# Patient Record
Sex: MALE | Race: WHITE | HISPANIC OR LATINO | Employment: FULL TIME | URBAN - METROPOLITAN AREA
[De-identification: names, ages, dates, MRNs, and addresses within clinical notes are randomized per-mention and may not be internally consistent; named-entity substitution may affect disease eponyms.]

---

## 2017-03-14 ENCOUNTER — OFFICE VISIT (OUTPATIENT)
Dept: MEDICAL GROUP | Facility: LAB | Age: 37
End: 2017-03-14
Payer: COMMERCIAL

## 2017-03-14 VITALS
SYSTOLIC BLOOD PRESSURE: 122 MMHG | HEIGHT: 69 IN | OXYGEN SATURATION: 94 % | WEIGHT: 225 LBS | DIASTOLIC BLOOD PRESSURE: 84 MMHG | BODY MASS INDEX: 33.33 KG/M2 | TEMPERATURE: 97.7 F | HEART RATE: 88 BPM | RESPIRATION RATE: 12 BRPM

## 2017-03-14 DIAGNOSIS — F51.01 PRIMARY INSOMNIA: ICD-10-CM

## 2017-03-14 DIAGNOSIS — R06.2 WHEEZING: ICD-10-CM

## 2017-03-14 PROCEDURE — 99214 OFFICE O/P EST MOD 30 MIN: CPT | Performed by: FAMILY MEDICINE

## 2017-03-14 RX ORDER — ZOLPIDEM TARTRATE 10 MG/1
10 TABLET ORAL NIGHTLY PRN
Qty: 30 TAB | Refills: 2 | Status: SHIPPED | OUTPATIENT
Start: 2017-03-14 | End: 2017-09-29 | Stop reason: SDUPTHER

## 2017-03-14 NOTE — PROGRESS NOTES
Subjective:   Andres Waldron is a 36 y.o. male here today for   Chief Complaint   Patient presents with   • Insomnia       1. Primary insomnia  This is a new problem. Patient reports 1 year of difficulty falling and staying asleep. He states that he goes to bed at about 11:30PM and wakes up at 8 AM. When he goes to bed, he has a difficult time falling asleep. He states that his mind starts racing and he cannot turn it off. He goes and checks on his kids often. When he does finally fall asleep he wakes up multiple times throughout the night. He is using electronics up until bedtime his phone or TB to wind down. He does not use any caffeine later then noon. He usually exercises later in the day after he gets off of work.     Results for ANDRES WALDRON (MRN [id number]) as of 3/14/2017 10:49   Ref. Range 12/21/2016 10:12   Sodium Latest Ref Range: 135-145 mmol/L 138   Potassium Latest Ref Range: 3.6-5.5 mmol/L 3.8   Chloride Latest Ref Range:  mmol/L 104   Co2 Latest Ref Range: 20-33 mmol/L 25   Anion Gap Latest Ref Range: 0.0-11.9  9.0   Glucose Latest Ref Range: 65-99 mg/dL 81   Bun Latest Ref Range: 8-22 mg/dL 19   Creatinine Latest Ref Range: 0.50-1.40 mg/dL 1.02   GFR If  Latest Ref Range: >60 mL/min/1.73 m 2 >60   GFR If Non  Latest Ref Range: >60 mL/min/1.73 m 2 >60   Calcium Latest Ref Range: 8.5-10.5 mg/dL 9.9   AST(SGOT) Latest Ref Range: 12-45 U/L 26   ALT(SGPT) Latest Ref Range: 2-50 U/L 30   Alkaline Phosphatase Latest Ref Range: 30-99 U/L 48   Total Bilirubin Latest Ref Range: 0.1-1.5 mg/dL 0.7   Albumin Latest Ref Range: 3.2-4.9 g/dL 4.4   Total Protein Latest Ref Range: 6.0-8.2 g/dL 7.8   Globulin Latest Ref Range: 1.9-3.5 g/dL 3.4   A-G Ratio Latest Units: g/dL 1.3   Cholesterol,Tot Latest Ref Range: 100-199 mg/dL 156   Triglycerides Latest Ref Range: 0-149 mg/dL 129   HDL Latest Ref Range: >=40 mg/dL 52   LDL Latest Ref Range: <100 mg/dL 78   25-Hydroxy    "Vitamin D 25 Latest Ref Range:  ng/mL 29 (L)   TSH Latest Ref Range: 0.300-3.700 uIU/mL 1.060     2. Wheezing  This is a new problem. Patient has noticed over the last several months, he starts wheezing at nighttime when he is going to bed. He does not wheeze when he is at work. He denies any chronic cough. He has never had seasonal allergies or asthma. They do have a pet at home but this isn't new and the wheezing started before. He denies any rhinorrhea, postnasal drip, itchy eyes, sputum production, chest pain, snoring, sleep apnea.      Current medicines (including changes today)  Current Outpatient Prescriptions   Medication Sig Dispense Refill   • zolpidem (AMBIEN) 10 MG Tab Take 1 Tab by mouth at bedtime as needed for Sleep. 30 Tab 2   • Adalimumab (HUMIRA) 40 MG/0.8ML Prefilled Syringe Kit Inject  as instructed.       No current facility-administered medications for this visit.     He  has a past medical history of Crohn's disease (CMS-HCC) and Allergy.    ROS   No fevers  No bowel changes  No LE edema       Objective:     Blood pressure 122/84, pulse 88, temperature 36.5 °C (97.7 °F), resp. rate 12, height 1.753 m (5' 9.02\"), weight 102.059 kg (225 lb), SpO2 94 %. Body mass index is 33.21 kg/(m^2).   Physical Exam:  Constitutional: Alert, no distress.  Skin: Warm, dry, good turgor, no rashes in visible areas.  Eye: Equal, round and reactive, conjunctiva clear, lids normal.  ENMT: Lips without lesions, good dentition, oropharynx clear.  Neck: Trachea midline, no masses, no thyromegaly. No cervical or supraclavicular lymphadenopathy  Respiratory: Unlabored respiratory effort, lungs clear to auscultation, no wheezes, no ronchi.  Cardiovascular: Normal S1, S2, RRR, no murmur, no edema.  Abdomen: Soft, non-tender, no masses, no hepatosplenomegaly.  Psych: Alert and oriented x3, normal affect and mood.      Assessment and Plan:   The following treatment plan was discussed    1. Primary insomnia  New, labs " reviewed as above with a normal recent TSH and vitamin D level of 29, no anemia, no electrolyte deficiencies  Start melatonin nightly  If no improvement, start ambien, Rx given. Counseled not to take this nightly  Lifestyle modifications discussed  - zolpidem (AMBIEN) 10 MG Tab; Take 1 Tab by mouth at bedtime as needed for Sleep.  Dispense: 30 Tab; Refill: 2    2. Wheezing  New, likely allergy to something at home. Discussed starting daily Zyrtec and monitoring symptoms. Patient states that there is no mold at home but he will check for this as well  Referral to allergy for skin testing possibly  - REFERRAL TO ALLERGY      Followup: Return in about 6 months (around 9/14/2017), or if symptoms worsen or fail to improve, for Annual, Long.       This note was created using voice recognition software. I have made every reasonable attempt to correct errors, however, I do anticipate some grammatical errors.

## 2017-03-14 NOTE — MR AVS SNAPSHOT
"        Andres Thacker   3/14/2017 8:20 AM   Office Visit   MRN: [id number]    Department:  [facility]   Dept Phone:  [phone]    Description:  Male : 1980   Provider:  Ne Guthrie M.D.           Reason for Visit     Insomnia           Allergies as of 3/14/2017     Allergen Noted Reactions    Pcn [Penicillins] 2015   Hives    Remicade [Infliximab] 2015   Anaphylaxis      You were diagnosed with     Primary insomnia   [377375]       Wheezing   [786.07.ICD-9-CM]         Vital Signs     Blood Pressure Pulse Temperature Respirations Height Weight    122/84 mmHg 88 36.5 °C (97.7 °F) 12 1.753 m (5' 9.02\") 102.059 kg (225 lb)    Body Mass Index Oxygen Saturation Smoking Status             33.21 kg/m2 94% Never Smoker          Basic Information     Date Of Birth Sex Race Ethnicity Preferred Language    1980 Male White Non- English      Problem List              ICD-10-CM Priority Class Noted - Resolved    Obesity (BMI 30-39.9) E66.9   2016 - Present    Low vitamin D level E55.9   2016 - Present    Health care maintenance Z00.00   2016 - Present    Crohn's disease of both small and large intestine without complication (CMS-[facility]) K50.80   2016 - Present    Primary insomnia F51.01   3/14/2017 - Present    Wheezing R06.2   3/14/2017 - Present      Health Maintenance        Date Due Completion Dates    IMM DTaP/Tdap/Td Vaccine (1 - Tdap) 10/16/1998 ---            Current Immunizations     13-VALENT PCV PREVNAR 2016    Influenza Vaccine Quad Inj (Pf) 2016      Below and/or attached are the medications your provider expects you to take. Review all of your home medications and newly ordered medications with your provider and/or pharmacist. Follow medication instructions as directed by your provider and/or pharmacist. Please keep your medication list with you and share with your provider. Update the information when medications are " discontinued, doses are changed, or new medications (including over-the-counter products) are added; and carry medication information at all times in the event of emergency situations     Allergies:  PCN - Hives     REMICADE - Anaphylaxis               Medications  Valid as of: March 14, 2017 -  8:57 AM    Generic Name Brand Name Tablet Size Instructions for use    Adalimumab (Prefilled Syringe Kit) Adalimumab 40 MG/0.8ML Inject  as instructed.        Zolpidem Tartrate (Tab) AMBIEN 10 MG Take 1 Tab by mouth at bedtime as needed for Sleep.        .                 Medicines prescribed today were sent to:     [facility]Y #[id number] - [place], [place] - [address]    [address] [place] [address]    Phone: [phone] Fax: [phone]    Open 24 Hours?: No      Medication refill instructions:       If your prescription bottle indicates you have medication refills left, it is not necessary to call your provider’s office. Please contact your pharmacy and they will refill your medication.    If your prescription bottle indicates you do not have any refills left, you may request refills at any time through one of the following ways: The online "[employer] system (except Urgent Care), by calling your provider’s office, or by asking your pharmacy to contact your provider’s office with a refill request. Medication refills are processed only during regular business hours and may not be available until the next business day. Your provider may request additional information or to have a follow-up visit with you prior to refilling your medication.   *Please Note: Medication refills are assigned a new Rx number when refilled electronically. Your pharmacy may indicate that no refills were authorized even though a new prescription for the same medication is available at the pharmacy. Please request the medicine by name with the pharmacy before contacting your provider for a refill.        Referral     A referral request has been  sent to our patient care coordination department. Please allow 3-5 business days for us to process this request and contact you either by phone or mail. If you do not hear from us by the 5th business day, please call us at [phone].           [employer] Access Code: Activation code not generated  Current [employer] Status: Active

## 2017-04-19 ENCOUNTER — HOSPITAL ENCOUNTER (OUTPATIENT)
Dept: LAB | Facility: MEDICAL CENTER | Age: 37
End: 2017-04-19
Attending: INTERNAL MEDICINE
Payer: COMMERCIAL

## 2017-04-19 LAB
BASOPHILS # BLD AUTO: 0.9 % (ref 0–1.8)
BASOPHILS # BLD: 0.06 K/UL (ref 0–0.12)
EOSINOPHIL # BLD AUTO: 0.45 K/UL (ref 0–0.51)
EOSINOPHIL NFR BLD: 7 % (ref 0–6.9)
ERYTHROCYTE [DISTWIDTH] IN BLOOD BY AUTOMATED COUNT: 41.7 FL (ref 35.9–50)
HCT VFR BLD AUTO: 46.5 % (ref 42–52)
HGB BLD-MCNC: 15.9 G/DL (ref 14–18)
IMM GRANULOCYTES # BLD AUTO: 0.01 K/UL (ref 0–0.11)
IMM GRANULOCYTES NFR BLD AUTO: 0.2 % (ref 0–0.9)
LYMPHOCYTES # BLD AUTO: 2.04 K/UL (ref 1–4.8)
LYMPHOCYTES NFR BLD: 31.9 % (ref 22–41)
MCH RBC QN AUTO: 31.4 PG (ref 27–33)
MCHC RBC AUTO-ENTMCNC: 34.2 G/DL (ref 33.7–35.3)
MCV RBC AUTO: 91.9 FL (ref 81.4–97.8)
MONOCYTES # BLD AUTO: 0.67 K/UL (ref 0–0.85)
MONOCYTES NFR BLD AUTO: 10.5 % (ref 0–13.4)
NEUTROPHILS # BLD AUTO: 3.17 K/UL (ref 1.82–7.42)
NEUTROPHILS NFR BLD: 49.5 % (ref 44–72)
NRBC # BLD AUTO: 0 K/UL
NRBC BLD AUTO-RTO: 0 /100 WBC
PLATELET # BLD AUTO: 279 K/UL (ref 164–446)
PMV BLD AUTO: 9.4 FL (ref 9–12.9)
RBC # BLD AUTO: 5.06 M/UL (ref 4.7–6.1)
WBC # BLD AUTO: 6.4 K/UL (ref 4.8–10.8)

## 2017-04-19 PROCEDURE — 80053 COMPREHEN METABOLIC PANEL: CPT

## 2017-04-19 PROCEDURE — 36415 COLL VENOUS BLD VENIPUNCTURE: CPT

## 2017-04-19 PROCEDURE — 85025 COMPLETE CBC W/AUTO DIFF WBC: CPT

## 2017-04-20 LAB
ALBUMIN SERPL BCP-MCNC: 4.2 G/DL (ref 3.2–4.9)
ALBUMIN/GLOB SERPL: 1.2 G/DL
ALP SERPL-CCNC: 60 U/L (ref 30–99)
ALT SERPL-CCNC: 19 U/L (ref 2–50)
ANION GAP SERPL CALC-SCNC: 9 MMOL/L (ref 0–11.9)
AST SERPL-CCNC: 16 U/L (ref 12–45)
BILIRUB SERPL-MCNC: 0.6 MG/DL (ref 0.1–1.5)
BUN SERPL-MCNC: 15 MG/DL (ref 8–22)
CALCIUM SERPL-MCNC: 9.7 MG/DL (ref 8.5–10.5)
CHLORIDE SERPL-SCNC: 104 MMOL/L (ref 96–112)
CO2 SERPL-SCNC: 27 MMOL/L (ref 20–33)
CREAT SERPL-MCNC: 0.95 MG/DL (ref 0.5–1.4)
GFR SERPL CREATININE-BSD FRML MDRD: >60 ML/MIN/1.73 M 2
GLOBULIN SER CALC-MCNC: 3.6 G/DL (ref 1.9–3.5)
GLUCOSE SERPL-MCNC: 96 MG/DL (ref 65–99)
POTASSIUM SERPL-SCNC: 4.1 MMOL/L (ref 3.6–5.5)
PROT SERPL-MCNC: 7.8 G/DL (ref 6–8.2)
SODIUM SERPL-SCNC: 140 MMOL/L (ref 135–145)

## 2017-05-03 ENCOUNTER — OFFICE VISIT (OUTPATIENT)
Dept: URGENT CARE | Facility: CLINIC | Age: 37
End: 2017-05-03
Payer: COMMERCIAL

## 2017-05-03 VITALS
HEART RATE: 84 BPM | TEMPERATURE: 98 F | OXYGEN SATURATION: 97 % | SYSTOLIC BLOOD PRESSURE: 110 MMHG | WEIGHT: 227 LBS | RESPIRATION RATE: 20 BRPM | BODY MASS INDEX: 33.51 KG/M2 | DIASTOLIC BLOOD PRESSURE: 72 MMHG

## 2017-05-03 DIAGNOSIS — R07.89 DISCOMFORT OF CHEST WALL: ICD-10-CM

## 2017-05-03 LAB — EKG 4674: NORMAL

## 2017-05-03 PROCEDURE — 99213 OFFICE O/P EST LOW 20 MIN: CPT | Performed by: PHYSICIAN ASSISTANT

## 2017-05-03 NOTE — PROGRESS NOTES
"Chief Complaint   Patient presents with   • Pain     Since yesterday middle back pain, today rib cage pain .       HISTORY OF PRESENT ILLNESS: Patient is a 36 y.o. male who presents today for bilateral anterior rib cage pain, however mor consistently worse on the left.  He does feel there is some on the right as well.  Not constant, waxing and waning.    Laying down helps a bit.   No radiation into his arms or neck.   Breathing does not exacerbate the pain at all.   No SOB.   He has not been coughing or sick.   He has had a bit of nasal and throat congestion but no coughing.   No fevers.  He was at a wedding this weekend and admits he drank a bit too much and wondered if it is indigestion but says Tums did not help.  He admits it feels \"like a stomach ache\" but worse.   No injuries.   Only significant medical history is Crohn's for which he takes Humira.     Patient Active Problem List    Diagnosis Date Noted   • Primary insomnia 03/14/2017   • Wheezing 03/14/2017   • Obesity (BMI 30-39.9) 11/29/2016   • Low vitamin D level 11/29/2016   • Health care maintenance 11/29/2016   • Crohn's disease of both small and large intestine without complication (CMS-HCC) 11/29/2016       Allergies:Pcn and Remicade    Current Outpatient Prescriptions Ordered in [facility]   Medication Sig Dispense Refill   • valacyclovir (VALTREX) 1 GM Tab Take 1 Tab by mouth 3 times a day for 7 days. 21 Tab 0   • oxycodone-acetaminophen (PERCOCET-10)  MG Tab Take 1-2 Tabs by mouth every four hours as needed for Severe Pain. 45 Tab 0   • zolpidem (AMBIEN) 10 MG Tab Take 1 Tab by mouth at bedtime as needed for Sleep. 30 Tab 2   • Adalimumab (HUMIRA) 40 MG/0.8ML Prefilled Syringe Kit Inject  as instructed.       No current [facility]-ordered facility-administered medications on file.       Past Medical History   Diagnosis Date   • Crohn's disease (CMS-HCC)    • Allergy        Social History   Substance Use Topics   • Smoking status: Never Smoker    • " Smokeless tobacco: Never Used   • Alcohol Use: Yes      Comment: occasional        Family Status   Relation Status Death Age   • Mother Alive    • Father Alive      Family History   Problem Relation Age of Onset   • Other Mother      eczema   • No Known Problems Father        ROS:  Review of Systems   Constitutional: Negative for fever, chills, weight loss and malaise/fatigue.   HENT: Negative for ear pain, nosebleeds, congestion, sore throat and neck pain.    Eyes: Negative for blurred vision.   Respiratory: Negative for cough, sputum production, shortness of breath and wheezing.    Cardiovascular: SEE HPI  Gastrointestinal: Negative for heartburn, nausea, vomiting and abdominal pain.   All other systems reviewed and are negative.       Exam:  Blood pressure 110/72, pulse 84, temperature 36.7 °C (98 °F), resp. rate 20, weight 102.967 kg (227 lb), SpO2 97 %.  General:  Well nourished, well developed male in NAD  Eyes: PERRLA, EOM within normal limits, no conjunctival injection, no scleral icterus, visual fields and acuity grossly intact.  Ears: Normal shape and symmetry, no tenderness, no discharge. External canals are without any significant edema or erythema. Tympanic membranes are without any inflammation, no effusion. Gross auditory acuity is intact  Nose: Symmetrical, sinuses without tenderness, no discharge.   Mouth: reasonable hygiene, no erythema exudates or tonsillar enlargement.  Neck: no masses, range of motion within normal limits, no tracheal deviation. No lymphadenopathy  Pulmonary: Normal respiratory effort, no wheezes, crackles, or rhonchi.  Cardiovascular: regular rate and rhythm without murmurs, rubs, or gallops.  Abdomen: Soft, nontender, nondistended. Normal bowel sounds. No hepatosplenomegaly or masses, or hernias. No rebound or guarding.  Skin: No visible rashes or lesion. Warm, pink, dry.   Extremities: no clubbing, cyanosis, or edema.  Neuro: A&O x 3. Speech normal/clear.  Normal gait.        EKG Interpretation   Interpreted by me   Rhythm: normal sinus   Rate: normal   Axis: normal   Ectopy: none   Conduction: normal   ST Segments: no acute change   T Waves: no acute change   Q Waves: none   Clinical Impression: no acute changes and normal EKG      Assessment/Plan:  1. Chest wall discomfort  EKG       -negative EKG   -vitals stable, nothing to suggest anything pulmonary at this time.   -vague presentation, suspect musculoskeletal at this point.   -patient is currently complaining of left>right chest pain, however not completley unilateral, patient is young however is on Humira.    Monitor for the development of any new symptoms at all discussed, SOB, change in character of pain, rashes, nausea, vomiting etc.   -can trial Tylenol ES or anti-inflammatory with food and continue to monitor.   -RTC precautions      Supportive care, differential diagnoses, and indications for immediate follow-up discussed with patient.   Pathogenesis of diagnosis discussed including typical length and natural progression.   Instructed to return to clinic or nearest emergency department for any change in condition, further concerns, or worsening of symptoms.  Patient states understanding of the plan of care and discharge instructions.  Instructed to make an appointment, for follow up, with their primary care provider.      Dora Rowe PA-C

## 2017-05-03 NOTE — MR AVS SNAPSHOT
Andres Gutierrez Kedar   5/3/2017 3:00 PM   Office Visit   MRN: [id number]    Department:  [facility]   Dept Phone:  [phone]    Description:  Male : 1980   Provider:  Dora Rowe PA-C           Reason for Visit     Pain Since yesterday middle back pain, today rib cage pain .      Allergies as of 5/3/2017     Allergen Noted Reactions    Pcn [Penicillins] 2015   Hives    Remicade [Infliximab] 2015   Anaphylaxis      You were diagnosed with     Chest wall discomfort   [891357]         Vital Signs     Blood Pressure Pulse Temperature Respirations Weight Oxygen Saturation    110/72 mmHg 84 36.7 °C (98 °F) 20 102.967 kg (227 lb) 97%    Smoking Status                   Never Smoker            Basic Information     Date Of Birth Sex Race Ethnicity Preferred Language    1980 Male White Non- English      Problem List              ICD-10-CM Priority Class Noted - Resolved    Obesity (BMI 30-39.9) E66.9   2016 - Present    Low vitamin D level E55.9   2016 - Present    Health care maintenance Z00.00   2016 - Present    Crohn's disease of both small and large intestine without complication (CMS-HCC) K50.80   2016 - Present    Primary insomnia F51.01   3/14/2017 - Present    Wheezing R06.2   3/14/2017 - Present      Health Maintenance        Date Due Completion Dates    IMM DTaP/Tdap/Td Vaccine (1 - Tdap) 10/16/1998 ---            Results     EKG      Component    EKG                        Current Immunizations     13-VALENT PCV PREVNAR 2016    Influenza Vaccine Quad Inj (Pf) 2016      Below and/or attached are the medications your provider expects you to take. Review all of your home medications and newly ordered medications with your provider and/or pharmacist. Follow medication instructions as directed by your provider and/or pharmacist. Please keep your medication list with you and share with your provider. Update the information  when medications are discontinued, doses are changed, or new medications (including over-the-counter products) are added; and carry medication information at all times in the event of emergency situations     Allergies:  PCN - Hives     REMICADE - Anaphylaxis               Medications  Valid as of: May 03, 2017 -  3:58 PM    Generic Name Brand Name Tablet Size Instructions for use    Adalimumab (Prefilled Syringe Kit) Adalimumab 40 MG/0.8ML Inject  as instructed.        Zolpidem Tartrate (Tab) AMBIEN 10 MG Take 1 Tab by mouth at bedtime as needed for Sleep.        .                 Medicines prescribed today were sent to:     [facility]/PHARMACY #[id number] - [place], [place] - [address]    [address] [place] [address]    Phone: [phone] Fax: [phone]    Open 24 Hours?: No      Medication refill instructions:       If your prescription bottle indicates you have medication refills left, it is not necessary to call your provider’s office. Please contact your pharmacy and they will refill your medication.    If your prescription bottle indicates you do not have any refills left, you may request refills at any time through one of the following ways: The online [employer] system (except Urgent Care), by calling your provider’s office, or by asking your pharmacy to contact your provider’s office with a refill request. Medication refills are processed only during regular business hours and may not be available until the next business day. Your provider may request additional information or to have a follow-up visit with you prior to refilling your medication.   *Please Note: Medication refills are assigned a new Rx number when refilled electronically. Your pharmacy may indicate that no refills were authorized even though a new prescription for the same medication is available at the pharmacy. Please request the medicine by name with the pharmacy before contacting your provider for a refill.           [employer]  Code: Activation code not generated  Current MyChart Status: Active

## 2017-05-05 ENCOUNTER — TELEPHONE (OUTPATIENT)
Dept: MEDICAL GROUP | Facility: LAB | Age: 37
End: 2017-05-05

## 2017-05-05 ENCOUNTER — OFFICE VISIT (OUTPATIENT)
Dept: MEDICAL GROUP | Facility: LAB | Age: 37
End: 2017-05-05
Payer: COMMERCIAL

## 2017-05-05 VITALS
RESPIRATION RATE: 16 BRPM | TEMPERATURE: 97 F | DIASTOLIC BLOOD PRESSURE: 80 MMHG | BODY MASS INDEX: 33.04 KG/M2 | HEIGHT: 69 IN | OXYGEN SATURATION: 95 % | WEIGHT: 223.11 LBS | SYSTOLIC BLOOD PRESSURE: 142 MMHG | HEART RATE: 83 BPM

## 2017-05-05 DIAGNOSIS — E66.9 OBESITY (BMI 30-39.9): ICD-10-CM

## 2017-05-05 DIAGNOSIS — B02.9 HERPES ZOSTER WITHOUT COMPLICATION: ICD-10-CM

## 2017-05-05 PROCEDURE — 99214 OFFICE O/P EST MOD 30 MIN: CPT | Performed by: FAMILY MEDICINE

## 2017-05-05 RX ORDER — VALACYCLOVIR HYDROCHLORIDE 1 G/1
1000 TABLET, FILM COATED ORAL 3 TIMES DAILY
Qty: 21 TAB | Refills: 0 | Status: SHIPPED | OUTPATIENT
Start: 2017-05-05 | End: 2017-05-12

## 2017-05-05 RX ORDER — OXYCODONE AND ACETAMINOPHEN 10; 325 MG/1; MG/1
1-2 TABLET ORAL EVERY 4 HOURS PRN
Qty: 45 TAB | Refills: 0 | Status: SHIPPED | OUTPATIENT
Start: 2017-05-05 | End: 2017-05-12 | Stop reason: SDUPTHER

## 2017-05-05 NOTE — TELEPHONE ENCOUNTER
Patient has been having middle back pain and chest pain for a week and now has a rash on left chest that raps to his left middle back. Was told by  to follow up with provider but [facility] and would like to be seen today if possible.

## 2017-05-06 NOTE — PROGRESS NOTES
"Subjective:   Andres Thacker is a 36 y.o. male here today for   Chief Complaint   Patient presents with   • Rash   • Herpes Zoster       1. Herpes zoster without complication  This is a new problem to discuss. Patient had chest wall pain starting Wednesday after returning home from a trip to [place]. The pain was so severe that he went to . At that time the EKG was negative. He was sent home with instructions to rest. He continued to have pain and then awoke this AM with a painful rash that is blistering. It is burning and aching. The rash wraps around his chest to the back. He has not been around anyone with similar rash. He does of Crohns and is on humira. His last injection was this AM.     2. Obesity (BMI 30-39.9)  This is chronic. Patient is not exercising regularly.     Current medicines (including changes today)  Current Outpatient Prescriptions   Medication Sig Dispense Refill   • valacyclovir (VALTREX) 1 GM Tab Take 1 Tab by mouth 3 times a day for 7 days. 21 Tab 0   • oxycodone-acetaminophen (PERCOCET-10)  MG Tab Take 1-2 Tabs by mouth every four hours as needed for Severe Pain. 45 Tab 0   • zolpidem (AMBIEN) 10 MG Tab Take 1 Tab by mouth at bedtime as needed for Sleep. 30 Tab 2   • Adalimumab (HUMIRA) 40 MG/0.8ML Prefilled Syringe Kit Inject  as instructed.       No current facility-administered medications for this visit.     He  has a past medical history of Crohn's disease (CMS-[facility]) and Allergy.    ROS   No fevers  No bowel changes  No LE edema       Objective:     Blood pressure 142/80, pulse 83, temperature 36.1 °C (97 °F), resp. rate 16, height 1.753 m (5' 9.02\"), weight 101.2 kg (223 lb 1.7 oz), SpO2 95 %. Body mass index is 32.93 kg/(m^2).   Physical Exam:  Constitutional: Alert, no distress.  Skin: Warm, dry, good turgor, shingles rash on the left chest/back in dermotomal pattern  Eye: Equal, round and reactive, conjunctiva clear, lids normal.  ENMT: Lips without lesions, good " dentition, oropharynx clear.  Neck: Trachea midline, no masses, no thyromegaly. No cervical or supraclavicular lymphadenopathy  Respiratory: Unlabored respiratory effort, lungs clear to auscultation, no wheezes, no ronchi.  Cardiovascular: Normal S1, S2, RRR, no murmur, no edema.  Abdomen: Soft, non-tender, no masses, no hepatosplenomegaly.  Psych: Alert and oriented x3, normal affect and mood.      Assessment and Plan:   The following treatment plan was discussed    1. Herpes zoster without complication  New, not controlled  Treat with Valtrex 1g TID x 7d, onset < 72 hrs ago  Pain control with Percocet  May need gabapentin, we did discuss this, he will let me know if the nerve pain worsens  Return after rash heals and we will give him shingles vaccine.   - valacyclovir (VALTREX) 1 GM Tab; Take 1 Tab by mouth 3 times a day for 7 days.  Dispense: 21 Tab; Refill: 0  - oxycodone-acetaminophen (PERCOCET-10)  MG Tab; Take 1-2 Tabs by mouth every four hours as needed for Severe Pain.  Dispense: 45 Tab; Refill: 0    2. Obesity (BMI 30-39.9)  Chronic, stable  - Patient identified as having weight management issue.  Appropriate orders and counseling given.      Followup: Return in about 4 weeks (around 6/2/2017), or if symptoms worsen or fail to improve.       This note was created using voice recognition software. I have made every reasonable attempt to correct errors, however, I do anticipate some grammatical errors.

## 2017-05-12 ENCOUNTER — OFFICE VISIT (OUTPATIENT)
Dept: MEDICAL GROUP | Facility: LAB | Age: 37
End: 2017-05-12
Payer: COMMERCIAL

## 2017-05-12 VITALS
HEIGHT: 69 IN | DIASTOLIC BLOOD PRESSURE: 82 MMHG | WEIGHT: 222 LBS | BODY MASS INDEX: 32.88 KG/M2 | HEART RATE: 75 BPM | SYSTOLIC BLOOD PRESSURE: 100 MMHG | TEMPERATURE: 98.1 F | RESPIRATION RATE: 16 BRPM | OXYGEN SATURATION: 92 %

## 2017-05-12 DIAGNOSIS — B02.9 HERPES ZOSTER WITHOUT COMPLICATION: ICD-10-CM

## 2017-05-12 DIAGNOSIS — B02.29 POSTHERPETIC NEURALGIA: ICD-10-CM

## 2017-05-12 DIAGNOSIS — B02.29 POST HERPETIC NEURALGIA: ICD-10-CM

## 2017-05-12 PROCEDURE — 99214 OFFICE O/P EST MOD 30 MIN: CPT | Performed by: FAMILY MEDICINE

## 2017-05-12 RX ORDER — GABAPENTIN 300 MG/1
300 CAPSULE ORAL 2 TIMES DAILY
Qty: 60 CAP | Refills: 2 | Status: SHIPPED | OUTPATIENT
Start: 2017-05-12 | End: 2017-07-10 | Stop reason: SDUPTHER

## 2017-05-12 RX ORDER — OXYCODONE AND ACETAMINOPHEN 10; 325 MG/1; MG/1
1-2 TABLET ORAL EVERY 4 HOURS PRN
Qty: 30 TAB | Refills: 0 | Status: SHIPPED | OUTPATIENT
Start: 2017-05-12 | End: 2017-06-07

## 2017-05-12 NOTE — MR AVS SNAPSHOT
"        Andres Thakcer   2017 4:40 PM   Office Visit   MRN: [id number]    Department:  [facility]   Dept Phone:  [phone]    Description:  Male : 1980   Provider:  Ne Guthrie M.D.           Allergies as of 2017     Allergen Noted Reactions    Pcn [Penicillins] 2015   Hives    Remicade [Infliximab] 2015   Anaphylaxis      Vital Signs     Blood Pressure Pulse Temperature Respirations Height Weight    100/82 mmHg 75 36.7 °C (98.1 °F) 16 1.753 m (5' 9.02\") 100.7 kg (222 lb 0.1 oz)    Body Mass Index Oxygen Saturation Smoking Status             32.77 kg/m2 92% Never Smoker          Basic Information     Date Of Birth Sex Race Ethnicity Preferred Language    1980 Male White Non- English      Your appointments     2017  1:40 PM   Established Patient with Ne Guthrie M.D.   [facility] (--)    [address] [address] [address]   [phone]           You will be receiving a confirmation call a few days before your appointment from our automated call confirmation system.              Problem List              ICD-10-CM Priority Class Noted - Resolved    Obesity (BMI 30-39.9) E66.9   2016 - Present    Low vitamin D level E55.9   2016 - Present    Health care maintenance Z00.00   2016 - Present    Crohn's disease of both small and large intestine without complication (CMS-[facility]) K50.80   2016 - Present    Primary insomnia F51.01   3/14/2017 - Present    Wheezing R06.2   3/14/2017 - Present      Health Maintenance        Date Due Completion Dates    IMM DTaP/Tdap/Td Vaccine (1 - Tdap) 10/16/1998 ---            Current Immunizations     13-VALENT PCV PREVNAR 2016    Influenza Vaccine Quad Inj (Pf) 2016      Below and/or attached are the medications your provider expects you to take. Review all of your home medications and newly ordered medications with your " provider and/or pharmacist. Follow medication instructions as directed by your provider and/or pharmacist. Please keep your medication list with you and share with your provider. Update the information when medications are discontinued, doses are changed, or new medications (including over-the-counter products) are added; and carry medication information at all times in the event of emergency situations     Allergies:  PCN - Hives     REMICADE - Anaphylaxis               Medications  Valid as of: May 12, 2017 -  4:48 PM    Generic Name Brand Name Tablet Size Instructions for use    Adalimumab (Prefilled Syringe Kit) Adalimumab 40 MG/0.8ML Inject  as instructed.        Gabapentin (Cap) NEURONTIN 300 MG Take 1 Cap by mouth 2 Times a Day.        Oxycodone-Acetaminophen (Tab) PERCOCET-10  MG Take 1-2 Tabs by mouth every four hours as needed for Severe Pain.        ValACYclovir HCl (Tab) VALTREX 1 GM Take 1 Tab by mouth 3 times a day for 7 days.        Zolpidem Tartrate (Tab) AMBIEN 10 MG Take 1 Tab by mouth at bedtime as needed for Sleep.        .                 Medicines prescribed today were sent to:     [facility]/PHARMACY #[id number] - [place], [place] - [address]    [address] [place] [address]    Phone: [phone] Fax: [phone]    Open 24 Hours?: No      Medication refill instructions:       If your prescription bottle indicates you have medication refills left, it is not necessary to call your provider’s office. Please contact your pharmacy and they will refill your medication.    If your prescription bottle indicates you do not have any refills left, you may request refills at any time through one of the following ways: The online SailPlay system (except Urgent Care), by calling your provider’s office, or by asking your pharmacy to contact your provider’s office with a refill request. Medication refills are processed only during regular business hours and may not be available until the next business  day. Your provider may request additional information or to have a follow-up visit with you prior to refilling your medication.   *Please Note: Medication refills are assigned a new Rx number when refilled electronically. Your pharmacy may indicate that no refills were authorized even though a new prescription for the same medication is available at the pharmacy. Please request the medicine by name with the pharmacy before contacting your provider for a refill.           [employer] Access Code: Activation code not generated  Current [employer] Status: Active

## 2017-05-13 NOTE — PROGRESS NOTES
"Subjective:   Andres Thacker is a 36 y.o. male here today for   Chief Complaint   Patient presents with   • Herpes Zoster       1. Postherpetic neuralgia  This is a new problem to discuss. Patient was recently diagnosed with shingles one week ago. We treated him with Valtrex 1 g 3 times a day. He just finished these. He has been taking  Percocet 10 mg as needed for pain. The pain is severe and has not diminished all. He is concerned because we are going into the weekend and he is unsure if he has enough pain medication to get him through. The pain is burning and stabbing. The rash is improving and is drying. He is keeping it covered. He denies any other pain in his body. He does not want to be on chronic narcotics.    Current medicines (including changes today)  Current Outpatient Prescriptions   Medication Sig Dispense Refill   • oxycodone-acetaminophen (PERCOCET-10)  MG Tab Take 1-2 Tabs by mouth every four hours as needed for Severe Pain. 30 Tab 0   • gabapentin (NEURONTIN) 300 MG Cap Take 1 Cap by mouth 2 Times a Day. 60 Cap 2   • valacyclovir (VALTREX) 1 GM Tab Take 1 Tab by mouth 3 times a day for 7 days. 21 Tab 0   • zolpidem (AMBIEN) 10 MG Tab Take 1 Tab by mouth at bedtime as needed for Sleep. 30 Tab 2   • Adalimumab (HUMIRA) 40 MG/0.8ML Prefilled Syringe Kit Inject  as instructed.       No current facility-administered medications for this visit.     He  has a past medical history of Crohn's disease (CMS-HCC) and Allergy.    ROS   No fevers  No bowel changes  No LE edema       Objective:     Blood pressure 100/82, pulse 75, temperature 36.7 °C (98.1 °F), resp. rate 16, height 1.753 m (5' 9.02\"), weight 100.7 kg (222 lb 0.1 oz), SpO2 92 %. Body mass index is 32.77 kg/(m^2).   Physical Exam:  General: No acute distress, WN/WD  HEENT: NC/AT, lids normal without lesions, good dentition  Neck: Trachea midline  Cardiovascular: Regular Rate  Pulmonary: No respiratory distress  Skin: herpetic rash on " the chest to back in dermatomal pattern. It is dry and scabbing  Neurologic: CN II-XII grossly intact, normal gait  Psych: Alert and oriented x 3, normal insight and judgement      Assessment and Plan:   The following treatment plan was discussed    1. Postherpetic neuralgia  New, not controlled  Start gabapentin 300 mg twice a day  Percocet only as needed  Follow-up in 3 weeks      Followup: Return in about 3 weeks (around 6/2/2017) for postherpetic neuralgia.       This note was created using voice recognition software. I have made every reasonable attempt to correct errors, however, I do anticipate some grammatical errors.

## 2017-06-07 ENCOUNTER — OFFICE VISIT (OUTPATIENT)
Dept: MEDICAL GROUP | Facility: LAB | Age: 37
End: 2017-06-07
Payer: COMMERCIAL

## 2017-06-07 VITALS
TEMPERATURE: 97.9 F | BODY MASS INDEX: 32.82 KG/M2 | RESPIRATION RATE: 12 BRPM | HEART RATE: 76 BPM | WEIGHT: 221.56 LBS | SYSTOLIC BLOOD PRESSURE: 108 MMHG | DIASTOLIC BLOOD PRESSURE: 80 MMHG | OXYGEN SATURATION: 97 % | HEIGHT: 69 IN

## 2017-06-07 DIAGNOSIS — R79.89 LOW VITAMIN D LEVEL: ICD-10-CM

## 2017-06-07 DIAGNOSIS — K50.80 CROHN'S DISEASE OF BOTH SMALL AND LARGE INTESTINE WITHOUT COMPLICATION (HCC): ICD-10-CM

## 2017-06-07 DIAGNOSIS — B02.29 POSTHERPETIC NEURALGIA: ICD-10-CM

## 2017-06-07 DIAGNOSIS — Z86.19 HISTORY OF SHINGLES: ICD-10-CM

## 2017-06-07 DIAGNOSIS — Z00.00 HEALTH MAINTENANCE EXAMINATION: ICD-10-CM

## 2017-06-07 PROCEDURE — 99214 OFFICE O/P EST MOD 30 MIN: CPT | Performed by: FAMILY MEDICINE

## 2017-06-07 RX ORDER — AZATHIOPRINE 50 MG/1
50 TABLET ORAL DAILY
Qty: 30 TAB | COMMUNITY
Start: 2017-06-07

## 2017-06-07 NOTE — MR AVS SNAPSHOT
"Andres Thacker   2017 1:40 PM   Office Visit   MRN: [id number]    Department:  [facility]   Dept Phone:  [phone]    Description:  Male : 1980   Provider:  Ne Guthrie M.D.           Reason for Visit     Follow-Up     Herpes Zoster           Allergies as of 2017     Allergen Noted Reactions    Pcn [Penicillins] 2015   Hives    Remicade [Infliximab] 2015   Anaphylaxis      You were diagnosed with     Postherpetic neuralgia   [235174]       History of shingles   [168608]       Crohn's disease of both small and large intestine without complication (CMS-HCC)   [779526]       Need for vaccination   [457082]       Low vitamin D level   [5496814]       Health maintenance examination   [937469]         Vital Signs     Blood Pressure Pulse Temperature Respirations Height Weight    108/80 mmHg 76 36.6 °C (97.9 °F) 12 1.753 m (5' 9.02\") 100.5 kg (221 lb 9 oz)    Body Mass Index Oxygen Saturation Smoking Status             32.70 kg/m2 97% Never Smoker          Basic Information     Date Of Birth Sex Race Ethnicity Preferred Language    1980 Male White Non- English      Your appointments     Dec 07, 2017  3:00 PM   ANNUAL EXAM PREVENTATIVE with Ne Guthrie M.D.   [facility] (--)    [address] [address] [id number] [phone]              Problem List              ICD-10-CM Priority Class Noted - Resolved    Obesity (BMI 30-39.9) E66.9   2016 - Present    Low vitamin D level E55.9   2016 - Present    Health care maintenance Z00.00   2016 - Present    Crohn's disease of both small and large intestine without complication (CMS-HCC) K50.80   2016 - Present    Primary insomnia F51.01   3/14/2017 - Present    Wheezing R06.2   3/14/2017 - Present    Postherpetic neuralgia B02.29   2017 - Present      Health Maintenance        Date Due Completion Dates    IMM DTaP/Tdap/Td " Vaccine (1 - Tdap) 10/16/1998 ---            Current Immunizations     13-VALENT PCV PREVNAR 11/29/2016    Influenza Vaccine Quad Inj (Pf) 11/29/2016      Below and/or attached are the medications your provider expects you to take. Review all of your home medications and newly ordered medications with your provider and/or pharmacist. Follow medication instructions as directed by your provider and/or pharmacist. Please keep your medication list with you and share with your provider. Update the information when medications are discontinued, doses are changed, or new medications (including over-the-counter products) are added; and carry medication information at all times in the event of emergency situations     Allergies:  PCN - Hives     REMICADE - Anaphylaxis               Medications  Valid as of: June 07, 2017 -  2:04 PM    Generic Name Brand Name Tablet Size Instructions for use    Adalimumab (Prefilled Syringe Kit) Adalimumab 40 MG/0.8ML Inject  as instructed.        AzaTHIOprine (Tab) IMURAN 50 MG Take 1 Tab by mouth every day.        Gabapentin (Cap) NEURONTIN 300 MG Take 1 Cap by mouth 2 Times a Day.        Zolpidem Tartrate (Tab) AMBIEN 10 MG Take 1 Tab by mouth at bedtime as needed for Sleep.        .                 Medicines prescribed today were sent to:     [facility]/PHARMACY #[id number] - [place], [place] - [address]    [address] [place] [address]    Phone: [phone] Fax: [phone]    Open 24 Hours?: No      Medication refill instructions:       If your prescription bottle indicates you have medication refills left, it is not necessary to call your provider’s office. Please contact your pharmacy and they will refill your medication.    If your prescription bottle indicates you do not have any refills left, you may request refills at any time through one of the following ways: The online Smart GPS Backpack system (except Urgent Care), by calling your provider’s office, or by asking your pharmacy to contact  your provider’s office with a refill request. Medication refills are processed only during regular business hours and may not be available until the next business day. Your provider may request additional information or to have a follow-up visit with you prior to refilling your medication.   *Please Note: Medication refills are assigned a new Rx number when refilled electronically. Your pharmacy may indicate that no refills were authorized even though a new prescription for the same medication is available at the pharmacy. Please request the medicine by name with the pharmacy before contacting your provider for a refill.        Your To Do List     Future Labs/Procedures Complete By Expires    COMP METABOLIC PANEL  As directed 6/8/2018    LIPID PROFILE  As directed 6/8/2018    VITAMIN D,25 HYDROXY  As directed 6/8/2018         MyChart Access Code: Activation code not generated  Current [employer] Status: Active

## 2017-06-07 NOTE — PROGRESS NOTES
"Subjective:   Andres Thacker is a 36 y.o. male here today for   Chief Complaint   Patient presents with   • Follow-Up   • Herpes Zoster       1. Postherpetic neuralgia  2. History of shingles  This is chronic. Patient's rash has nearly resolved. His pain is improving. He is currently on gabapentin 300mg bid. He does not need hte narcotics. He would like to get off of this. He is currently on Imuran and humira. He does have small children at home    3. Crohn's disease of both small and large intestine without complication (CMS-HCC)    This is chronic. I did speak with Dr. Schwartz regarding his shingles outbreak. Unfortunately, the patient had just taken a Humira injection after the rash began so we cannot stop this. I did discuss his Humira and she'll affect seemed. Unfortunately, we cannot give that she has vaccine with Humira.      Current medicines (including changes today)  Current Outpatient Prescriptions   Medication Sig Dispense Refill   • azathioprine (IMURAN) 50 MG Tab Take 1 Tab by mouth every day. 30 Tab    • gabapentin (NEURONTIN) 300 MG Cap Take 1 Cap by mouth 2 Times a Day. 60 Cap 2   • Adalimumab (HUMIRA) 40 MG/0.8ML Prefilled Syringe Kit Inject  as instructed.     • zolpidem (AMBIEN) 10 MG Tab Take 1 Tab by mouth at bedtime as needed for Sleep. 30 Tab 2     No current facility-administered medications for this visit.     He  has a past medical history of Crohn's disease (CMS-HCC) and Allergy.    ROS   No fevers  No bowel changes  No LE edema       Objective:     Blood pressure 108/80, pulse 76, temperature 36.6 °C (97.9 °F), resp. rate 12, height 1.753 m (5' 9.02\"), weight 100.5 kg (221 lb 9 oz), SpO2 97 %. Body mass index is 32.7 kg/(m^2).   Physical Exam:  General: No acute distress, WN/WD  HEENT: NC/AT, lids normal without lesions, good dentition  Neck: Trachea midline  Cardiovascular: Regular Rate  Pulmonary: No respiratory distress  Skin: Patient has a faint scarring from the shingles on " the left side of his chest  Neurologic: CN II-XII grossly intact, normal gait  Psych: Alert and oriented x 3, normal insight and judgement      Assessment and Plan:   The following treatment plan was discussed    1. Postherpetic neuralgia  2. History of shingles  Chronic, improving  We will try to wean off of gabapentin, take one tab daily for 3 days, then every other day for 3 days then stop  If his pain comes back, he will restart the gabapentin  Unable to give shingles vaccine due to Humira, discuss this with his gastroenterologist Dr. Schwartz     3. Crohn's disease of both small and large intestine without complication (CMS-HCC)  Chronic, stable, discussed immunization for previous shingles, however, given his Humira use we cannot do so. Discussed this with his gastroenterologist Dr. Schwartz.      Followup: Return in about 6 months (around 12/7/2017) for Annual.       This note was created using voice recognition software. I have made every reasonable attempt to correct errors, however, I do anticipate some grammatical errors.

## 2017-06-30 ENCOUNTER — OFFICE VISIT (OUTPATIENT)
Dept: MEDICAL GROUP | Facility: LAB | Age: 37
End: 2017-06-30
Payer: COMMERCIAL

## 2017-06-30 VITALS
TEMPERATURE: 98.2 F | HEART RATE: 78 BPM | BODY MASS INDEX: 32.44 KG/M2 | DIASTOLIC BLOOD PRESSURE: 82 MMHG | RESPIRATION RATE: 12 BRPM | OXYGEN SATURATION: 96 % | HEIGHT: 69 IN | SYSTOLIC BLOOD PRESSURE: 118 MMHG | WEIGHT: 219 LBS

## 2017-06-30 DIAGNOSIS — J02.0 STREPTOCOCCAL PHARYNGITIS: ICD-10-CM

## 2017-06-30 LAB
INT CON NEG: NEGATIVE
INT CON POS: POSITIVE
S PYO AG THROAT QL: NORMAL

## 2017-06-30 PROCEDURE — 99214 OFFICE O/P EST MOD 30 MIN: CPT | Performed by: FAMILY MEDICINE

## 2017-06-30 PROCEDURE — 87880 STREP A ASSAY W/OPTIC: CPT | Performed by: FAMILY MEDICINE

## 2017-06-30 RX ORDER — CLINDAMYCIN HYDROCHLORIDE 300 MG/1
300 CAPSULE ORAL 3 TIMES DAILY
Qty: 21 CAP | Refills: 0 | Status: SHIPPED | OUTPATIENT
Start: 2017-06-30 | End: 2017-07-07

## 2017-06-30 NOTE — PROGRESS NOTES
"Subjective:   Andres Thacker is a 36 y.o. male here today for   Chief Complaint   Patient presents with   • Fever     x 3 days   • Pharyngitis     x 3 days       1. Streptococcal pharyngitis  New Problem to discuss. Patient has had fevers, chills, muscle aches for the last 3 days. Fever is high as 101.5°F. He has had throat fullness without a significant amount of pain. He has had sick contacts with his kids with a febrile illness. They were recently seen by their pediatrician yesterday and swabbed for strep which was negative. He denies any ear pain, difficulty swallowing, cough. He feels an extreme fullness in his throat. He does have Crohn's disease and is scheduled to take his Humira on Saturday    Current medicines (including changes today)  Current Outpatient Prescriptions   Medication Sig Dispense Refill   • clindamycin (CLEOCIN) 300 MG Cap Take 1 Cap by mouth 3 times a day for 7 days. 21 Cap 0   • azathioprine (IMURAN) 50 MG Tab Take 1 Tab by mouth every day. 30 Tab    • zolpidem (AMBIEN) 10 MG Tab Take 1 Tab by mouth at bedtime as needed for Sleep. 30 Tab 2   • Adalimumab (HUMIRA) 40 MG/0.8ML Prefilled Syringe Kit Inject  as instructed.     • gabapentin (NEURONTIN) 300 MG Cap Take 1 Cap by mouth 2 Times a Day. 60 Cap 2     No current facility-administered medications for this visit.     He  has a past medical history of Crohn's disease (CMS-[facility]) and Allergy.    ROS   No bowel changes  No LE edema       Objective:     Blood pressure 118/82, pulse 78, temperature 36.8 °C (98.2 °F), resp. rate 12, height 1.753 m (5' 9.02\"), weight 99.338 kg (219 lb), SpO2 96 %. Body mass index is 32.33 kg/(m^2).   Physical Exam:  Constitutional: Alert, no distress. Muffled voice  Skin: Warm, dry, good turgor, no rashes in visible areas.  Eye: Equal, round and reactive, conjunctiva clear, lids normal.  ENMT: Lips without lesions, good dentition, oropharynx with kissing tonsils and purulent exudate. Tympanic membranes " normal bilaterally  Neck: Trachea midline, no masses, no thyromegaly. Positive cervicallymphadenopathy bilaterally  Respiratory: Unlabored respiratory effort, lungs clear to auscultation, no wheezes, no ronchi.  Cardiovascular: Normal S1, S2, RRR, no murmur, no edema.  Abdomen: Soft, non-tender, no masses, no hepatosplenomegaly.  Psych: Alert and oriented x3, normal affect and mood.    Rapid strep positive    Assessment and Plan:   The following treatment plan was discussed    1. Streptococcal pharyngitis  New, requires treatment  Strep test positive  Patient has a allergy to penicillin, I will treat with clindamycin given the severity of his symptoms.  At this point, there is a low likelihood that patient has peritonsillar abscess, but I did discuss symptoms of this with him including a hot potato voice and severe unilateral throat pain. For these he should go to the ER this weekend. He is immunocompromised given his Crohn's disease treatment.  - clindamycin (CLEOCIN) 300 MG Cap; Take 1 Cap by mouth 3 times a day for 7 days.  Dispense: 21 Cap; Refill: 0  - POCT Rapid Strep A      Followup: Return if symptoms worsen or fail to improve.       This note was created using voice recognition software. I have made every reasonable attempt to correct errors, however, I do anticipate some grammatical errors.

## 2017-06-30 NOTE — MR AVS SNAPSHOT
"        Andres Thacker   2017 11:20 AM   Office Visit   MRN: [id number]    Department:  [facility]   Dept Phone:  [phone]    Description:  Male : 1980   Provider:  Ne Guthrie M.D.           Reason for Visit     Fever x 3 days    Pharyngitis x 3 days      Allergies as of 2017     Allergen Noted Reactions    Pcn [Penicillins] 2015   Hives    Remicade [Infliximab] 2015   Anaphylaxis      You were diagnosed with     Streptococcal pharyngitis   [1990]         Vital Signs     Blood Pressure Pulse Temperature Respirations Height Weight    118/82 mmHg 78 36.8 °C (98.2 °F) 12 1.753 m (5' 9.02\") 99.338 kg (219 lb)    Body Mass Index Oxygen Saturation Smoking Status             32.33 kg/m2 96% Never Smoker          Basic Information     Date Of Birth Sex Race Ethnicity Preferred Language    1980 Male White Non- English      Your appointments     Dec 07, 2017  3:00 PM   ANNUAL EXAM PREVENTATIVE with Ne Guthrie M.D.   [facility] (--)    [address] [phone]              Problem List              ICD-10-CM Priority Class Noted - Resolved    Obesity (BMI 30-39.9) E66.9   2016 - Present    Low vitamin D level E55.9   2016 - Present    Health care maintenance Z00.00   2016 - Present    Crohn's disease of both small and large intestine without complication (CMS-[facility]) K50.80   2016 - Present    Primary insomnia F51.01   3/14/2017 - Present    Wheezing R06.2   3/14/2017 - Present    Postherpetic neuralgia B02.29   2017 - Present      Health Maintenance        Date Due Completion Dates    IMM DTaP/Tdap/Td Vaccine (1 - Tdap) 10/16/1998 ---            Current Immunizations     13-VALENT PCV PREVNAR 2016    Influenza Vaccine Quad Inj (Pf) 2016      Below and/or attached are the medications your provider expects you to take. Review all of your " home medications and newly ordered medications with your provider and/or pharmacist. Follow medication instructions as directed by your provider and/or pharmacist. Please keep your medication list with you and share with your provider. Update the information when medications are discontinued, doses are changed, or new medications (including over-the-counter products) are added; and carry medication information at all times in the event of emergency situations     Allergies:  PCN - Hives     REMICADE - Anaphylaxis               Medications  Valid as of: June 30, 2017 - 11:53 AM    Generic Name Brand Name Tablet Size Instructions for use    Adalimumab (Prefilled Syringe Kit) Adalimumab 40 MG/0.8ML Inject  as instructed.        AzaTHIOprine (Tab) IMURAN 50 MG Take 1 Tab by mouth every day.        Clindamycin HCl (Cap) CLEOCIN 300 MG Take 1 Cap by mouth 3 times a day for 7 days.        Gabapentin (Cap) NEURONTIN 300 MG Take 1 Cap by mouth 2 Times a Day.        Zolpidem Tartrate (Tab) AMBIEN 10 MG Take 1 Tab by mouth at bedtime as needed for Sleep.        .                 Medicines prescribed today were sent to:     [facility]/PHARMACY #[id number] - [place], [place] - [address]    [address] [place] [address]    Phone: [phone] Fax: [phone]    Open 24 Hours?: No      Medication refill instructions:       If your prescription bottle indicates you have medication refills left, it is not necessary to call your provider’s office. Please contact your pharmacy and they will refill your medication.    If your prescription bottle indicates you do not have any refills left, you may request refills at any time through one of the following ways: The online Message[employer] system (except Urgent Care), by calling your provider’s office, or by asking your pharmacy to contact your provider’s office with a refill request. Medication refills are processed only during regular business hours and may not be available until the next business  day. Your provider may request additional information or to have a follow-up visit with you prior to refilling your medication.   *Please Note: Medication refills are assigned a new Rx number when refilled electronically. Your pharmacy may indicate that no refills were authorized even though a new prescription for the same medication is available at the pharmacy. Please request the medicine by name with the pharmacy before contacting your provider for a refill.           [employer] Access Code: Activation code not generated  Current [employer] Status: Active

## 2017-07-10 DIAGNOSIS — B02.29 POST HERPETIC NEURALGIA: ICD-10-CM

## 2017-07-10 RX ORDER — GABAPENTIN 300 MG/1
300 CAPSULE ORAL 2 TIMES DAILY
Qty: 180 CAP | Refills: 2 | Status: SHIPPED | OUTPATIENT
Start: 2017-07-10 | End: 2020-09-30

## 2017-07-10 NOTE — TELEPHONE ENCOUNTER
Was the patient seen in the last year in this department? Yes patient requesting 90 day supply    Does patient have an active prescription for medications requested? No     Received Request Via: Pharmacy

## 2017-07-18 ENCOUNTER — HOSPITAL ENCOUNTER (OUTPATIENT)
Dept: LAB | Facility: MEDICAL CENTER | Age: 37
End: 2017-07-18
Attending: INTERNAL MEDICINE
Payer: COMMERCIAL

## 2017-07-18 LAB
ALBUMIN SERPL BCP-MCNC: 3.9 G/DL (ref 3.2–4.9)
ALBUMIN/GLOB SERPL: 1.2 G/DL
ALP SERPL-CCNC: 50 U/L (ref 30–99)
ALT SERPL-CCNC: 23 U/L (ref 2–50)
ANION GAP SERPL CALC-SCNC: 4 MMOL/L (ref 0–11.9)
AST SERPL-CCNC: 19 U/L (ref 12–45)
BASOPHILS # BLD AUTO: 0.9 % (ref 0–1.8)
BASOPHILS # BLD: 0.06 K/UL (ref 0–0.12)
BILIRUB SERPL-MCNC: 0.6 MG/DL (ref 0.1–1.5)
BUN SERPL-MCNC: 16 MG/DL (ref 8–22)
CALCIUM SERPL-MCNC: 9.5 MG/DL (ref 8.5–10.5)
CHLORIDE SERPL-SCNC: 105 MMOL/L (ref 96–112)
CO2 SERPL-SCNC: 27 MMOL/L (ref 20–33)
CREAT SERPL-MCNC: 0.93 MG/DL (ref 0.5–1.4)
EOSINOPHIL # BLD AUTO: 0.44 K/UL (ref 0–0.51)
EOSINOPHIL NFR BLD: 6.6 % (ref 0–6.9)
ERYTHROCYTE [DISTWIDTH] IN BLOOD BY AUTOMATED COUNT: 44 FL (ref 35.9–50)
GFR SERPL CREATININE-BSD FRML MDRD: >60 ML/MIN/1.73 M 2
GLOBULIN SER CALC-MCNC: 3.3 G/DL (ref 1.9–3.5)
GLUCOSE SERPL-MCNC: 96 MG/DL (ref 65–99)
HCT VFR BLD AUTO: 43.5 % (ref 42–52)
HGB BLD-MCNC: 14.6 G/DL (ref 14–18)
IMM GRANULOCYTES # BLD AUTO: 0.01 K/UL (ref 0–0.11)
IMM GRANULOCYTES NFR BLD AUTO: 0.1 % (ref 0–0.9)
LYMPHOCYTES # BLD AUTO: 2.77 K/UL (ref 1–4.8)
LYMPHOCYTES NFR BLD: 41.5 % (ref 22–41)
MCH RBC QN AUTO: 31.3 PG (ref 27–33)
MCHC RBC AUTO-ENTMCNC: 33.6 G/DL (ref 33.7–35.3)
MCV RBC AUTO: 93.1 FL (ref 81.4–97.8)
MONOCYTES # BLD AUTO: 0.62 K/UL (ref 0–0.85)
MONOCYTES NFR BLD AUTO: 9.3 % (ref 0–13.4)
NEUTROPHILS # BLD AUTO: 2.78 K/UL (ref 1.82–7.42)
NEUTROPHILS NFR BLD: 41.6 % (ref 44–72)
NRBC # BLD AUTO: 0 K/UL
NRBC BLD AUTO-RTO: 0 /100 WBC
PLATELET # BLD AUTO: 313 K/UL (ref 164–446)
PMV BLD AUTO: 9.2 FL (ref 9–12.9)
POTASSIUM SERPL-SCNC: 3.7 MMOL/L (ref 3.6–5.5)
PROT SERPL-MCNC: 7.2 G/DL (ref 6–8.2)
RBC # BLD AUTO: 4.67 M/UL (ref 4.7–6.1)
SODIUM SERPL-SCNC: 136 MMOL/L (ref 135–145)
WBC # BLD AUTO: 6.7 K/UL (ref 4.8–10.8)

## 2017-07-18 PROCEDURE — 80053 COMPREHEN METABOLIC PANEL: CPT

## 2017-07-18 PROCEDURE — 85025 COMPLETE CBC W/AUTO DIFF WBC: CPT

## 2017-07-18 PROCEDURE — 36415 COLL VENOUS BLD VENIPUNCTURE: CPT

## 2017-09-29 DIAGNOSIS — F51.01 PRIMARY INSOMNIA: ICD-10-CM

## 2017-09-29 RX ORDER — ZOLPIDEM TARTRATE 10 MG/1
TABLET ORAL
Qty: 30 TAB | Refills: 5 | Status: SHIPPED
Start: 2017-09-29 | End: 2018-04-03 | Stop reason: SDUPTHER

## 2017-09-29 NOTE — TELEPHONE ENCOUNTER
Prescription faxed to:    [facility]/PHARMACY #[id number] - [place] - [address]  [address]  [place] [address]  Phone: [phone] Fax: [phone]  .

## 2017-09-29 NOTE — TELEPHONE ENCOUNTER
Was the patient seen in the last year in this department? Yes lov 6/30/2017  6/11/17 Qty 30    Does patient have an active prescription for medications requested? No     Received Request Via: Pharmacy

## 2017-10-20 ENCOUNTER — HOSPITAL ENCOUNTER (OUTPATIENT)
Dept: LAB | Facility: MEDICAL CENTER | Age: 37
End: 2017-10-20
Attending: INTERNAL MEDICINE
Payer: COMMERCIAL

## 2017-10-20 LAB
25(OH)D3 SERPL-MCNC: 27 NG/ML (ref 30–100)
ALBUMIN SERPL BCP-MCNC: 4.1 G/DL (ref 3.2–4.9)
ALBUMIN/GLOB SERPL: 1.1 G/DL
ALP SERPL-CCNC: 53 U/L (ref 30–99)
ALT SERPL-CCNC: 31 U/L (ref 2–50)
ANION GAP SERPL CALC-SCNC: 7 MMOL/L (ref 0–11.9)
AST SERPL-CCNC: 23 U/L (ref 12–45)
BASOPHILS # BLD AUTO: 0.8 % (ref 0–1.8)
BASOPHILS # BLD: 0.05 K/UL (ref 0–0.12)
BILIRUB SERPL-MCNC: 0.6 MG/DL (ref 0.1–1.5)
BUN SERPL-MCNC: 15 MG/DL (ref 8–22)
CALCIUM SERPL-MCNC: 9.3 MG/DL (ref 8.5–10.5)
CHLORIDE SERPL-SCNC: 105 MMOL/L (ref 96–112)
CO2 SERPL-SCNC: 27 MMOL/L (ref 20–33)
CREAT SERPL-MCNC: 0.9 MG/DL (ref 0.5–1.4)
CRP SERPL HS-MCNC: 0.44 MG/DL (ref 0–0.75)
EOSINOPHIL # BLD AUTO: 0.44 K/UL (ref 0–0.51)
EOSINOPHIL NFR BLD: 7.4 % (ref 0–6.9)
ERYTHROCYTE [DISTWIDTH] IN BLOOD BY AUTOMATED COUNT: 41.2 FL (ref 35.9–50)
FERRITIN SERPL-MCNC: 87.5 NG/ML (ref 22–322)
GFR SERPL CREATININE-BSD FRML MDRD: >60 ML/MIN/1.73 M 2
GLOBULIN SER CALC-MCNC: 3.6 G/DL (ref 1.9–3.5)
GLUCOSE SERPL-MCNC: 93 MG/DL (ref 65–99)
HCT VFR BLD AUTO: 45.9 % (ref 42–52)
HGB BLD-MCNC: 15.9 G/DL (ref 14–18)
IMM GRANULOCYTES # BLD AUTO: 0.01 K/UL (ref 0–0.11)
IMM GRANULOCYTES NFR BLD AUTO: 0.2 % (ref 0–0.9)
IRON SATN MFR SERPL: 21 % (ref 15–55)
IRON SERPL-MCNC: 74 UG/DL (ref 50–180)
LYMPHOCYTES # BLD AUTO: 2.48 K/UL (ref 1–4.8)
LYMPHOCYTES NFR BLD: 41.8 % (ref 22–41)
MCH RBC QN AUTO: 31.9 PG (ref 27–33)
MCHC RBC AUTO-ENTMCNC: 34.6 G/DL (ref 33.7–35.3)
MCV RBC AUTO: 92 FL (ref 81.4–97.8)
MONOCYTES # BLD AUTO: 0.6 K/UL (ref 0–0.85)
MONOCYTES NFR BLD AUTO: 10.1 % (ref 0–13.4)
NEUTROPHILS # BLD AUTO: 2.36 K/UL (ref 1.82–7.42)
NEUTROPHILS NFR BLD: 39.7 % (ref 44–72)
NRBC # BLD AUTO: 0 K/UL
NRBC BLD AUTO-RTO: 0 /100 WBC
PLATELET # BLD AUTO: 269 K/UL (ref 164–446)
PMV BLD AUTO: 9.1 FL (ref 9–12.9)
POTASSIUM SERPL-SCNC: 3.9 MMOL/L (ref 3.6–5.5)
PROT SERPL-MCNC: 7.7 G/DL (ref 6–8.2)
RBC # BLD AUTO: 4.99 M/UL (ref 4.7–6.1)
SODIUM SERPL-SCNC: 139 MMOL/L (ref 135–145)
TIBC SERPL-MCNC: 354 UG/DL (ref 250–450)
VIT B12 SERPL-MCNC: 449 PG/ML (ref 211–911)
WBC # BLD AUTO: 5.9 K/UL (ref 4.8–10.8)

## 2017-10-20 PROCEDURE — 85025 COMPLETE CBC W/AUTO DIFF WBC: CPT

## 2017-10-20 PROCEDURE — 82306 VITAMIN D 25 HYDROXY: CPT

## 2017-10-20 PROCEDURE — 82607 VITAMIN B-12: CPT

## 2017-10-20 PROCEDURE — 83550 IRON BINDING TEST: CPT

## 2017-10-20 PROCEDURE — 86140 C-REACTIVE PROTEIN: CPT

## 2017-10-20 PROCEDURE — 82728 ASSAY OF FERRITIN: CPT

## 2017-10-20 PROCEDURE — 36415 COLL VENOUS BLD VENIPUNCTURE: CPT

## 2017-10-20 PROCEDURE — 80053 COMPREHEN METABOLIC PANEL: CPT

## 2017-10-20 PROCEDURE — 83540 ASSAY OF IRON: CPT

## 2017-10-25 ENCOUNTER — HOSPITAL ENCOUNTER (OUTPATIENT)
Facility: MEDICAL CENTER | Age: 37
End: 2017-10-25
Attending: INTERNAL MEDICINE
Payer: COMMERCIAL

## 2017-10-25 PROCEDURE — 83993 ASSAY FOR CALPROTECTIN FECAL: CPT

## 2017-10-28 LAB — CALPROTECTIN STL-MCNT: <16 UG/G

## 2017-12-07 ENCOUNTER — OFFICE VISIT (OUTPATIENT)
Dept: MEDICAL GROUP | Facility: LAB | Age: 37
End: 2017-12-07
Payer: COMMERCIAL

## 2017-12-07 VITALS
HEIGHT: 69 IN | OXYGEN SATURATION: 94 % | DIASTOLIC BLOOD PRESSURE: 84 MMHG | SYSTOLIC BLOOD PRESSURE: 108 MMHG | WEIGHT: 216 LBS | HEART RATE: 88 BPM | RESPIRATION RATE: 12 BRPM | TEMPERATURE: 98.9 F | BODY MASS INDEX: 31.99 KG/M2

## 2017-12-07 DIAGNOSIS — J06.9 ACUTE URI: ICD-10-CM

## 2017-12-07 DIAGNOSIS — E66.9 OBESITY (BMI 30-39.9): ICD-10-CM

## 2017-12-07 PROCEDURE — 99214 OFFICE O/P EST MOD 30 MIN: CPT | Performed by: NURSE PRACTITIONER

## 2017-12-07 RX ORDER — DOXYCYCLINE HYCLATE 100 MG
100 TABLET ORAL 2 TIMES DAILY
Qty: 14 TAB | Refills: 0 | Status: SHIPPED | OUTPATIENT
Start: 2017-12-07 | End: 2017-12-14

## 2017-12-07 RX ORDER — FLUTICASONE PROPIONATE 50 MCG
2 SPRAY, SUSPENSION (ML) NASAL DAILY
Qty: 1 BOTTLE | Refills: 3 | Status: SHIPPED | OUTPATIENT
Start: 2017-12-07 | End: 2023-03-29

## 2017-12-07 ASSESSMENT — PATIENT HEALTH QUESTIONNAIRE - PHQ9: CLINICAL INTERPRETATION OF PHQ2 SCORE: 0

## 2017-12-07 NOTE — PROGRESS NOTES
"Chief Complaint   Patient presents with   • Headache       HPI  Quinton is a 38 yo est male here with c/o new onset bilateral HA behind both ears, ST, congestion and not feeling well for the past 10 days. Symptoms seem to be worsening. Has associated bloody mucus when he blows his nose or clears his throat and coughs it up.  No cough per patient. Denies shortness of breath or wheezing.  Nonsmoker.  Wk:  unemployeed  Sick contact: children at home  Meds:  Ibuprofen, sudafed - helping temporarily.    He does have Crohn's disease and is receiving Humira.    Past medical, surgical, family, and social history is reviewed and updated in Epic chart by me today.   Medications and allergies reviewed and updated in Epic chart by me today.     ROS:   As documented in history of present illness above    Exam:  Blood pressure 108/84, pulse 88, temperature 37.2 °C (98.9 °F), resp. rate 12, height 1.753 m (5' 9.02\"), weight 98 kg (216 lb), SpO2 94 %.  Constitutional: Alert, no distress, plus 3 vital signs  Skin:  Warm, dry, no rashes invisible areas  Eye: Equal, round and reactive, conjunctiva clear  ENMT: Bilateral tympanic membranes are retracted but translucent without erythema or perforation. He reports minimal maxillary sinus tenderness. Oropharynx is slightly injected without exudate. No tonsillar enlargement.    Neck: Mild anterior cervical, nontender lymphadenopathy  Respiratory: Unlabored respiration, lungs clear to auscultation, no wheezes, no rhonchi  Cardiovascular: Normal rate and rhythm, no murmur, no edema  Psych: Alert, pleasant, well-groomed, normal affect      A/P:  1. Acute URI  -Because he is immunocompromised, symptoms have been present for 10 days and he is worsening, recommend doxycycline twice a day for 7 days and fluticasone daily for at least one week. Discussed appropriate amounts of Tylenol versus ibuprofen for his headache. Encouraged him to stay well hydrated and take vitamin C 250 g daily. He'll " follow-up with us within one week if he is not improving, sooner with any worsening.  - doxycycline (VIBRAMYCIN) 100 MG Tab; Take 1 Tab by mouth 2 times a day for 7 days.  Dispense: 14 Tab; Refill: 0  - fluticasone (FLONASE) 50 MCG/ACT nasal spray; Spray 2 Sprays in nose every day.  Dispense: 1 Bottle; Refill: 3

## 2018-04-02 ENCOUNTER — HOSPITAL ENCOUNTER (OUTPATIENT)
Dept: LAB | Facility: MEDICAL CENTER | Age: 38
End: 2018-04-02
Attending: INTERNAL MEDICINE
Payer: COMMERCIAL

## 2018-04-03 DIAGNOSIS — F51.01 PRIMARY INSOMNIA: ICD-10-CM

## 2018-04-03 RX ORDER — ZOLPIDEM TARTRATE 10 MG/1
TABLET ORAL
Qty: 30 TAB | Refills: 5 | Status: SHIPPED
Start: 2018-04-03 | End: 2018-04-11 | Stop reason: SDUPTHER

## 2018-04-03 NOTE — TELEPHONE ENCOUNTER
Prescription faxed to:    [facility]/PHARMACY #[id number] - [place] - [address]  [address]  [place] [address]  Phone: [phone] Fax: [phone]  .

## 2018-04-03 NOTE — TELEPHONE ENCOUNTER
Was the patient seen in the last year in this department? Yes  refill 2/13/18    Does patient have an active prescription for medications requested? No     Received Request Via: Patient

## 2018-04-11 DIAGNOSIS — F51.01 PRIMARY INSOMNIA: ICD-10-CM

## 2018-04-11 RX ORDER — ZOLPIDEM TARTRATE 10 MG/1
TABLET ORAL
Qty: 30 TAB | Refills: 5 | Status: SHIPPED
Start: 2018-04-11 | End: 2018-04-14 | Stop reason: SDUPTHER

## 2018-04-11 NOTE — TELEPHONE ENCOUNTER
Prescription faxed to:    [facility]/PHARMACY #[id number] - [place] - [address]  [address]  [place] [address]  Phone: [phone] Fax: [phone]  .

## 2018-04-11 NOTE — TELEPHONE ENCOUNTER
Was the patient seen in the last year in this department? Yes  refill 2/13/18    Does patient have an active prescription for medications requested? No     Received Request Via: Pharmacy

## 2018-04-14 DIAGNOSIS — F51.01 PRIMARY INSOMNIA: ICD-10-CM

## 2018-04-16 NOTE — TELEPHONE ENCOUNTER
Was the patient seen in the last year in this department? Yes Lov 12/7/17    Does patient have an active prescription for medications requested? No     Received Request Via: Pharmacy

## 2018-04-17 RX ORDER — ZOLPIDEM TARTRATE 10 MG/1
TABLET ORAL
Qty: 30 TAB | Refills: 5 | Status: SHIPPED | OUTPATIENT
Start: 2018-04-17 | End: 2018-05-17

## 2018-04-24 ENCOUNTER — HOSPITAL ENCOUNTER (OUTPATIENT)
Dept: LAB | Facility: MEDICAL CENTER | Age: 38
End: 2018-04-24
Attending: INTERNAL MEDICINE
Payer: COMMERCIAL

## 2018-04-24 LAB
ALBUMIN SERPL BCP-MCNC: 4.1 G/DL (ref 3.2–4.9)
ALP SERPL-CCNC: 46 U/L (ref 30–99)
ALT SERPL-CCNC: 29 U/L (ref 2–50)
AST SERPL-CCNC: 23 U/L (ref 12–45)
BASOPHILS # BLD AUTO: 0.9 % (ref 0–1.8)
BASOPHILS # BLD: 0.07 K/UL (ref 0–0.12)
BILIRUB CONJ SERPL-MCNC: 0.1 MG/DL (ref 0.1–0.5)
BILIRUB INDIRECT SERPL-MCNC: 0.8 MG/DL (ref 0–1)
BILIRUB SERPL-MCNC: 0.9 MG/DL (ref 0.1–1.5)
CRP SERPL HS-MCNC: 0.61 MG/DL (ref 0–0.75)
EOSINOPHIL # BLD AUTO: 0.22 K/UL (ref 0–0.51)
EOSINOPHIL NFR BLD: 2.9 % (ref 0–6.9)
ERYTHROCYTE [DISTWIDTH] IN BLOOD BY AUTOMATED COUNT: 42.8 FL (ref 35.9–50)
HCT VFR BLD AUTO: 46.4 % (ref 42–52)
HGB BLD-MCNC: 15.7 G/DL (ref 14–18)
IMM GRANULOCYTES # BLD AUTO: 0.01 K/UL (ref 0–0.11)
IMM GRANULOCYTES NFR BLD AUTO: 0.1 % (ref 0–0.9)
LYMPHOCYTES # BLD AUTO: 3.01 K/UL (ref 1–4.8)
LYMPHOCYTES NFR BLD: 40 % (ref 22–41)
MCH RBC QN AUTO: 31.8 PG (ref 27–33)
MCHC RBC AUTO-ENTMCNC: 33.8 G/DL (ref 33.7–35.3)
MCV RBC AUTO: 93.9 FL (ref 81.4–97.8)
MONOCYTES # BLD AUTO: 0.69 K/UL (ref 0–0.85)
MONOCYTES NFR BLD AUTO: 9.2 % (ref 0–13.4)
NEUTROPHILS # BLD AUTO: 3.52 K/UL (ref 1.82–7.42)
NEUTROPHILS NFR BLD: 46.9 % (ref 44–72)
NRBC # BLD AUTO: 0 K/UL
NRBC BLD-RTO: 0 /100 WBC
PLATELET # BLD AUTO: 291 K/UL (ref 164–446)
PMV BLD AUTO: 8.9 FL (ref 9–12.9)
PROT SERPL-MCNC: 8.1 G/DL (ref 6–8.2)
RBC # BLD AUTO: 4.94 M/UL (ref 4.7–6.1)
WBC # BLD AUTO: 7.5 K/UL (ref 4.8–10.8)

## 2018-04-24 PROCEDURE — 85025 COMPLETE CBC W/AUTO DIFF WBC: CPT

## 2018-04-24 PROCEDURE — 36415 COLL VENOUS BLD VENIPUNCTURE: CPT

## 2018-04-24 PROCEDURE — 80076 HEPATIC FUNCTION PANEL: CPT

## 2018-04-24 PROCEDURE — 86140 C-REACTIVE PROTEIN: CPT

## 2018-09-14 ENCOUNTER — TELEPHONE (OUTPATIENT)
Dept: MEDICAL GROUP | Facility: LAB | Age: 38
End: 2018-09-14

## 2018-09-14 DIAGNOSIS — Z23 NEED FOR VACCINATION: ICD-10-CM

## 2018-09-14 NOTE — TELEPHONE ENCOUNTER
1. Caller Name: Andres Otero is on the MA Schedule 917/18 for flu vaccine/injection.    SPECIFIC Action To Be Taken: Orders pending, please sign.

## 2018-09-17 ENCOUNTER — NON-PROVIDER VISIT (OUTPATIENT)
Dept: MEDICAL GROUP | Facility: LAB | Age: 38
End: 2018-09-17
Payer: COMMERCIAL

## 2018-09-17 DIAGNOSIS — Z23 NEED FOR VACCINATION: ICD-10-CM

## 2018-09-17 PROCEDURE — 90471 IMMUNIZATION ADMIN: CPT | Performed by: NURSE PRACTITIONER

## 2018-09-17 PROCEDURE — 90686 IIV4 VACC NO PRSV 0.5 ML IM: CPT | Performed by: NURSE PRACTITIONER

## 2018-09-17 NOTE — PROGRESS NOTES
"Andres Thacker is a 37 y.o. male here for a non-provider visit for:   FLU    Reason for immunization: Annual Flu Vaccine  Immunization records indicate need for vaccine: Yes, confirmed with Epic  Minimum interval has been met for this vaccine: Yes  ABN completed: Not Indicated    Order and dose verified by:   VIS Dated  8/7/2015 was given to patient: Yes  All IAC Questionnaire questions were answered \"No.\"    Patient tolerated injection and no adverse effects were observed or reported: Yes    Pt scheduled for next dose in series: Not Indicated    "

## 2019-02-05 DIAGNOSIS — F51.01 PRIMARY INSOMNIA: ICD-10-CM

## 2019-02-05 RX ORDER — ZOLPIDEM TARTRATE 10 MG/1
TABLET ORAL
Qty: 30 TAB | Refills: 0 | Status: SHIPPED
Start: 2019-02-05 | End: 2019-03-07

## 2019-02-20 DIAGNOSIS — K50.80 CROHN'S DISEASE OF BOTH SMALL AND LARGE INTESTINE WITHOUT COMPLICATION (HCC): ICD-10-CM

## 2019-02-20 DIAGNOSIS — R79.89 LOW VITAMIN D LEVEL: ICD-10-CM

## 2019-02-20 DIAGNOSIS — E66.9 OBESITY (BMI 30-39.9): ICD-10-CM

## 2019-02-20 DIAGNOSIS — Z00.00 HEALTH CARE MAINTENANCE: ICD-10-CM

## 2019-02-20 DIAGNOSIS — Z20.828 EXPOSURE TO INFLUENZA: ICD-10-CM

## 2019-02-20 RX ORDER — OSELTAMIVIR PHOSPHATE 75 MG/1
75 CAPSULE ORAL DAILY
Qty: 10 CAP | Refills: 0 | Status: SHIPPED | OUTPATIENT
Start: 2019-02-20 | End: 2019-03-02

## 2019-03-18 ENCOUNTER — HOSPITAL ENCOUNTER (OUTPATIENT)
Dept: LAB | Facility: MEDICAL CENTER | Age: 39
End: 2019-03-18
Attending: FAMILY MEDICINE
Payer: COMMERCIAL

## 2019-03-18 DIAGNOSIS — K50.80 CROHN'S DISEASE OF BOTH SMALL AND LARGE INTESTINE WITHOUT COMPLICATION (HCC): ICD-10-CM

## 2019-03-18 DIAGNOSIS — Z00.00 HEALTH CARE MAINTENANCE: ICD-10-CM

## 2019-03-18 DIAGNOSIS — E66.9 OBESITY (BMI 30-39.9): ICD-10-CM

## 2019-03-18 DIAGNOSIS — R79.89 LOW VITAMIN D LEVEL: ICD-10-CM

## 2019-03-18 LAB
25(OH)D3 SERPL-MCNC: 50 NG/ML (ref 30–100)
ALBUMIN SERPL BCP-MCNC: 4.1 G/DL (ref 3.2–4.9)
ALBUMIN/GLOB SERPL: 1.2 G/DL
ALP SERPL-CCNC: 51 U/L (ref 30–99)
ALT SERPL-CCNC: 22 U/L (ref 2–50)
ANION GAP SERPL CALC-SCNC: 9 MMOL/L (ref 0–11.9)
AST SERPL-CCNC: 18 U/L (ref 12–45)
BASOPHILS # BLD AUTO: 0.9 % (ref 0–1.8)
BASOPHILS # BLD: 0.04 K/UL (ref 0–0.12)
BILIRUB SERPL-MCNC: 0.7 MG/DL (ref 0.1–1.5)
BUN SERPL-MCNC: 19 MG/DL (ref 8–22)
CALCIUM SERPL-MCNC: 9.4 MG/DL (ref 8.5–10.5)
CHLORIDE SERPL-SCNC: 105 MMOL/L (ref 96–112)
CO2 SERPL-SCNC: 27 MMOL/L (ref 20–33)
CREAT SERPL-MCNC: 0.93 MG/DL (ref 0.5–1.4)
CRP SERPL HS-MCNC: 0.3 MG/DL (ref 0–0.75)
EOSINOPHIL # BLD AUTO: 0.35 K/UL (ref 0–0.51)
EOSINOPHIL NFR BLD: 7.6 % (ref 0–6.9)
ERYTHROCYTE [DISTWIDTH] IN BLOOD BY AUTOMATED COUNT: 45 FL (ref 35.9–50)
FASTING STATUS PATIENT QL REPORTED: NORMAL
FERRITIN SERPL-MCNC: 83.9 NG/ML (ref 22–322)
GLOBULIN SER CALC-MCNC: 3.3 G/DL (ref 1.9–3.5)
GLUCOSE SERPL-MCNC: 92 MG/DL (ref 65–99)
HCT VFR BLD AUTO: 43.9 % (ref 42–52)
HGB BLD-MCNC: 14.8 G/DL (ref 14–18)
IMM GRANULOCYTES # BLD AUTO: 0.01 K/UL (ref 0–0.11)
IMM GRANULOCYTES NFR BLD AUTO: 0.2 % (ref 0–0.9)
IRON SATN MFR SERPL: 29 % (ref 15–55)
IRON SERPL-MCNC: 98 UG/DL (ref 50–180)
LYMPHOCYTES # BLD AUTO: 1.75 K/UL (ref 1–4.8)
LYMPHOCYTES NFR BLD: 37.9 % (ref 22–41)
MCH RBC QN AUTO: 33.3 PG (ref 27–33)
MCHC RBC AUTO-ENTMCNC: 33.7 G/DL (ref 33.7–35.3)
MCV RBC AUTO: 98.7 FL (ref 81.4–97.8)
MONOCYTES # BLD AUTO: 0.47 K/UL (ref 0–0.85)
MONOCYTES NFR BLD AUTO: 10.2 % (ref 0–13.4)
NEUTROPHILS # BLD AUTO: 2 K/UL (ref 1.82–7.42)
NEUTROPHILS NFR BLD: 43.2 % (ref 44–72)
NRBC # BLD AUTO: 0 K/UL
NRBC BLD-RTO: 0 /100 WBC
PLATELET # BLD AUTO: 281 K/UL (ref 164–446)
PMV BLD AUTO: 8.9 FL (ref 9–12.9)
POTASSIUM SERPL-SCNC: 4.1 MMOL/L (ref 3.6–5.5)
PROT SERPL-MCNC: 7.4 G/DL (ref 6–8.2)
RBC # BLD AUTO: 4.45 M/UL (ref 4.7–6.1)
SODIUM SERPL-SCNC: 141 MMOL/L (ref 135–145)
TIBC SERPL-MCNC: 343 UG/DL (ref 250–450)
TSH SERPL DL<=0.005 MIU/L-ACNC: 1.13 UIU/ML (ref 0.38–5.33)
VIT B12 SERPL-MCNC: 393 PG/ML (ref 211–911)
WBC # BLD AUTO: 4.6 K/UL (ref 4.8–10.8)

## 2019-03-18 PROCEDURE — 86140 C-REACTIVE PROTEIN: CPT

## 2019-03-18 PROCEDURE — 80053 COMPREHEN METABOLIC PANEL: CPT

## 2019-03-18 PROCEDURE — 83540 ASSAY OF IRON: CPT

## 2019-03-18 PROCEDURE — 84443 ASSAY THYROID STIM HORMONE: CPT

## 2019-03-18 PROCEDURE — 85025 COMPLETE CBC W/AUTO DIFF WBC: CPT

## 2019-03-18 PROCEDURE — 83550 IRON BINDING TEST: CPT

## 2019-03-18 PROCEDURE — 82728 ASSAY OF FERRITIN: CPT

## 2019-03-18 PROCEDURE — 82306 VITAMIN D 25 HYDROXY: CPT

## 2019-03-18 PROCEDURE — 82607 VITAMIN B-12: CPT

## 2019-03-18 PROCEDURE — 36415 COLL VENOUS BLD VENIPUNCTURE: CPT

## 2019-11-01 ENCOUNTER — HOSPITAL ENCOUNTER (OUTPATIENT)
Dept: LAB | Facility: MEDICAL CENTER | Age: 39
End: 2019-11-01
Attending: HOSPITALIST
Payer: COMMERCIAL

## 2019-11-01 LAB
ALBUMIN SERPL BCP-MCNC: 4.2 G/DL (ref 3.2–4.9)
ALBUMIN/GLOB SERPL: 1.4 G/DL
ALP SERPL-CCNC: 46 U/L (ref 30–99)
ALT SERPL-CCNC: 27 U/L (ref 2–50)
ANION GAP SERPL CALC-SCNC: 7 MMOL/L (ref 0–11.9)
AST SERPL-CCNC: 23 U/L (ref 12–45)
BASOPHILS # BLD AUTO: 0.5 % (ref 0–1.8)
BASOPHILS # BLD: 0.03 K/UL (ref 0–0.12)
BILIRUB SERPL-MCNC: 0.6 MG/DL (ref 0.1–1.5)
BUN SERPL-MCNC: 20 MG/DL (ref 8–22)
CALCIUM SERPL-MCNC: 9.3 MG/DL (ref 8.5–10.5)
CHLORIDE SERPL-SCNC: 105 MMOL/L (ref 96–112)
CO2 SERPL-SCNC: 28 MMOL/L (ref 20–33)
CREAT SERPL-MCNC: 1.1 MG/DL (ref 0.5–1.4)
CRP SERPL HS-MCNC: 0.39 MG/DL (ref 0–0.75)
EOSINOPHIL # BLD AUTO: 0.26 K/UL (ref 0–0.51)
EOSINOPHIL NFR BLD: 4.6 % (ref 0–6.9)
ERYTHROCYTE [DISTWIDTH] IN BLOOD BY AUTOMATED COUNT: 42.5 FL (ref 35.9–50)
FASTING STATUS PATIENT QL REPORTED: NORMAL
GLOBULIN SER CALC-MCNC: 3 G/DL (ref 1.9–3.5)
GLUCOSE SERPL-MCNC: 82 MG/DL (ref 65–99)
HCT VFR BLD AUTO: 43.4 % (ref 42–52)
HGB BLD-MCNC: 15.2 G/DL (ref 14–18)
IMM GRANULOCYTES # BLD AUTO: 0.01 K/UL (ref 0–0.11)
IMM GRANULOCYTES NFR BLD AUTO: 0.2 % (ref 0–0.9)
LYMPHOCYTES # BLD AUTO: 2.77 K/UL (ref 1–4.8)
LYMPHOCYTES NFR BLD: 48.6 % (ref 22–41)
MCH RBC QN AUTO: 33 PG (ref 27–33)
MCHC RBC AUTO-ENTMCNC: 35 G/DL (ref 33.7–35.3)
MCV RBC AUTO: 94.1 FL (ref 81.4–97.8)
MONOCYTES # BLD AUTO: 0.62 K/UL (ref 0–0.85)
MONOCYTES NFR BLD AUTO: 10.9 % (ref 0–13.4)
NEUTROPHILS # BLD AUTO: 2.01 K/UL (ref 1.82–7.42)
NEUTROPHILS NFR BLD: 35.2 % (ref 44–72)
NRBC # BLD AUTO: 0 K/UL
NRBC BLD-RTO: 0 /100 WBC
PLATELET # BLD AUTO: 277 K/UL (ref 164–446)
PMV BLD AUTO: 9 FL (ref 9–12.9)
POTASSIUM SERPL-SCNC: 3.7 MMOL/L (ref 3.6–5.5)
PROT SERPL-MCNC: 7.2 G/DL (ref 6–8.2)
RBC # BLD AUTO: 4.61 M/UL (ref 4.7–6.1)
SODIUM SERPL-SCNC: 140 MMOL/L (ref 135–145)
WBC # BLD AUTO: 5.7 K/UL (ref 4.8–10.8)

## 2019-11-01 PROCEDURE — 85025 COMPLETE CBC W/AUTO DIFF WBC: CPT

## 2019-11-01 PROCEDURE — 86140 C-REACTIVE PROTEIN: CPT

## 2019-11-01 PROCEDURE — 80053 COMPREHEN METABOLIC PANEL: CPT

## 2019-11-01 PROCEDURE — 36415 COLL VENOUS BLD VENIPUNCTURE: CPT

## 2019-12-26 ENCOUNTER — TELEPHONE (OUTPATIENT)
Dept: MEDICAL GROUP | Facility: LAB | Age: 39
End: 2019-12-26

## 2019-12-27 NOTE — TELEPHONE ENCOUNTER
Pt asking for Ambien refill, he will need an appt. Pt has not been seen since 2017. Tried to call back voicemail was not set up.

## 2020-01-21 ENCOUNTER — OFFICE VISIT (OUTPATIENT)
Dept: MEDICAL GROUP | Facility: LAB | Age: 40
End: 2020-01-21
Payer: COMMERCIAL

## 2020-01-21 VITALS
HEART RATE: 60 BPM | SYSTOLIC BLOOD PRESSURE: 126 MMHG | HEIGHT: 69 IN | RESPIRATION RATE: 16 BRPM | WEIGHT: 213 LBS | DIASTOLIC BLOOD PRESSURE: 70 MMHG | OXYGEN SATURATION: 97 % | TEMPERATURE: 97.7 F | BODY MASS INDEX: 31.55 KG/M2

## 2020-01-21 DIAGNOSIS — R42 VERTIGO: ICD-10-CM

## 2020-01-21 DIAGNOSIS — E66.9 OBESITY (BMI 30-39.9): ICD-10-CM

## 2020-01-21 DIAGNOSIS — F51.01 PRIMARY INSOMNIA: ICD-10-CM

## 2020-01-21 PROCEDURE — 99214 OFFICE O/P EST MOD 30 MIN: CPT | Performed by: NURSE PRACTITIONER

## 2020-01-21 RX ORDER — ZOLPIDEM TARTRATE 10 MG/1
10 TABLET ORAL NIGHTLY PRN
Qty: 30 TAB | Refills: 2 | Status: SHIPPED | OUTPATIENT
Start: 2020-01-21 | End: 2020-02-20

## 2020-01-21 ASSESSMENT — PATIENT HEALTH QUESTIONNAIRE - PHQ9: CLINICAL INTERPRETATION OF PHQ2 SCORE: 0

## 2020-01-21 NOTE — PATIENT INSTRUCTIONS
Benign Positional Vertigo  Introduction  Vertigo is the feeling that you or your surroundings are moving when they are not. Benign positional vertigo is the most common form of vertigo. The cause of this condition is not serious (is benign). This condition is triggered by certain movements and positions (is positional). This condition can be dangerous if it occurs while you are doing something that could endanger you or others, such as driving.  What are the causes?  In many cases, the cause of this condition is not known. It may be caused by a disturbance in an area of the inner ear that helps your brain to sense movement and balance. This disturbance can be caused by a viral infection (labyrinthitis), head injury, or repetitive motion.  What increases the risk?  This condition is more likely to develop in:  · Women.  · People who are 50 years of age or older.  What are the signs or symptoms?  Symptoms of this condition usually happen when you move your head or your eyes in different directions. Symptoms may start suddenly, and they usually last for less than a minute. Symptoms may include:  · Loss of balance and falling.  · Feeling like you are spinning or moving.  · Feeling like your surroundings are spinning or moving.  · Nausea and vomiting.  · Blurred vision.  · Dizziness.  · Involuntary eye movement (nystagmus).  Symptoms can be mild and cause only slight annoyance, or they can be severe and interfere with daily life. Episodes of benign positional vertigo may return (recur) over time, and they may be triggered by certain movements. Symptoms may improve over time.  How is this diagnosed?  This condition is usually diagnosed by medical history and a physical exam of the head, neck, and ears. You may be referred to a health care provider who specializes in ear, nose, and throat (ENT) problems (otolaryngologist) or a provider who specializes in disorders of the nervous system (neurologist). You may have  additional testing, including:  · MRI.  · A CT scan.  · Eye movement tests. Your health care provider may ask you to change positions quickly while he or she watches you for symptoms of benign positional vertigo, such as nystagmus. Eye movement may be tested with an electronystagmogram (ENG), caloric stimulation, the Alexa-Hallpike test, or the roll test.  · An electroencephalogram (EEG). This records electrical activity in your brain.  · Hearing tests.  How is this treated?  Usually, your health care provider will treat this by moving your head in specific positions to adjust your inner ear back to normal. Surgery may be needed in severe cases, but this is rare. In some cases, benign positional vertigo may resolve on its own in 2-4 weeks.  Follow these instructions at home:  Safety  · Move slowly.Avoid sudden body or head movements.  · Avoid driving.  · Avoid operating heavy machinery.  · Avoid doing any tasks that would be dangerous to you or others if a vertigo episode would occur.  · If you have trouble walking or keeping your balance, try using a cane for stability. If you feel dizzy or unstable, sit down right away.  · Return to your normal activities as told by your health care provider. Ask your health care provider what activities are safe for you.  General instructions  · Take over-the-counter and prescription medicines only as told by your health care provider.  · Avoid certain positions or movements as told by your health care provider.  · Drink enough fluid to keep your urine clear or pale yellow.  · Keep all follow-up visits as told by your health care provider. This is important.  Contact a health care provider if:  · You have a fever.  · Your condition gets worse or you develop new symptoms.  · Your family or friends notice any behavioral changes.  · Your nausea or vomiting gets worse.  · You have numbness or a “pins and needles” sensation.  Get help right away if:  · You have difficulty speaking or  moving.  · You are always dizzy.  · You faint.  · You develop severe headaches.  · You have weakness in your legs or arms.  · You have changes in your hearing or vision.  · You develop a stiff neck.  · You develop sensitivity to light.  This information is not intended to replace advice given to you by your health care provider. Make sure you discuss any questions you have with your health care provider.  Document Released: 09/25/2007 Document Revised: 05/25/2017 Document Reviewed: 04/11/2016  © 2017 [employer]

## 2020-01-21 NOTE — PROGRESS NOTES
CC:Diagnoses of Primary insomnia, Obesity (BMI 30-39.9), and Vertigo were pertinent to this visit.    HISTORY OF PRESENT ILLNESS: Andres Thacker is a 39 y.o. male established patient who presents today to discuss the following problems:    Insomnia  This is chronic. Patient has difficulty with sleeping. Primarily secondary to mind racing. Difficulty falling asleep and staying asleep. Currently taking Ambien 1-2 nights weekly. They are not taking this every night.  I have reviewed the [facility] and there are no signs of abuse or misuse.  Will refill his Ambien prescription today    Obesity  Patient and I discussed the importance of lifestyle changes, with particular emphasis on decreasing sugar and carbohydrate intake and increasing plant-based nutrition (for the purposes of weight loss, general health, and prevention of chronic illnesses), as well as regular cardiovascular exercise, proper sleep, and stress management. Patient verbalized understanding.    Vertigo  Complains of dizziness described as a spinning sensation which she has had intermittently for several years.  He does have history of vertigo and has had good success with Epley maneuvers at home.  Occurs with  rapid head movements  Denies dimming vision, visual floaters, speech change, confusion, unconsciousness, drowsiness, personality change, headaches, paresthesias otalgia, otorrhea, tinnitus, hearing loss       Health Maintenance: Completed    Patient Active Problem List    Diagnosis Date Noted   • Vertigo 01/21/2020   • Postherpetic neuralgia 05/12/2017   • Primary insomnia 03/14/2017   • Wheezing 03/14/2017   • Obesity (BMI 30-39.9) 11/29/2016   • Low vitamin D level 11/29/2016   • Health care maintenance 11/29/2016   • Crohn's disease of both small and large intestine without complication ([facility]) 11/29/2016        Allergies:Pcn [penicillins] and Remicade [infliximab]    Current Outpatient Medications   Medication Sig Dispense Refill   •  "zolpidem (AMBIEN) 10 MG Tab Take 1 Tab by mouth at bedtime as needed for Sleep for up to 30 days. 30 Tab 2   • fluticasone (FLONASE) 50 MCG/ACT nasal spray Spray 2 Sprays in nose every day. 1 Bottle 3   • gabapentin (NEURONTIN) 300 MG Cap Take 1 Cap by mouth 2 Times a Day. 180 Cap 2   • azathioprine (IMURAN) 50 MG Tab Take 1 Tab by mouth every day. 30 Tab    • Adalimumab (HUMIRA) 40 MG/0.8ML Prefilled Syringe Kit Inject  as instructed.       No current facility-administered medications for this visit.        Social History     Tobacco Use   • Smoking status: Never Smoker   • Smokeless tobacco: Never Used   Substance Use Topics   • Alcohol use: Yes     Comment: occasional    • Drug use: No     Social History     Patient does not qualify to have social determinant information on file (likely too young).   Social History Narrative   • Not on file       Family History   Problem Relation Age of Onset   • Other Mother         eczema   • No Known Problems Father        ROS:     No fevers or weight loss  No headaches or sore throat  No chest pain or shortness of breath  No bowel changes  No lower extremity edema  No suicidal ideation or panic attack        EXAM:   /70 (BP Location: Right arm, Patient Position: Sitting, BP Cuff Size: Adult)   Pulse 60   Temp 36.5 °C (97.7 °F) (Temporal)   Resp 16   Ht 1.753 m (5' 9\")   Wt 96.6 kg (213 lb)   SpO2 97%  Body mass index is 31.45 kg/m².    Constitutional: Obese Well-developed and well-nourished. Not diaphoretic. No distress.   Skin: Skin is warm and dry. No rash noted.  Head: Atraumatic without lesions.  Eyes: Conjunctivae and extraocular motions are normal. Pupils are equal, round, and reactive to light. No scleral icterus.   Ears:  External ears unremarkable. Tympanic membranes clear and intact.  Nose: Nares patent. Mucosa without edema or erythema. No discharge. No facial tenderness.  Mouth/Throat: Tongue normal. Oropharynx is clear and moist. Posterior pharynx " without erythema or exudates.  Neck: Supple, trachea midline. No thyromegaly present. No cervical or supraclavicular lymphadenopathy.  Cardiovascular: Regular rate and rhythm.   Chest: Effort normal. Clear to auscultation throughout. No adventitious sounds.   Abdomen: Soft, non tender, and without distention. Active bowel sounds in all four quadrants. No rebound, guarding, masses or hepatosplenomegaly.  Extremities: No cyanosis, clubbing, erythema, nor edema.   Neurological: Alert and oriented x 3. Sensation intact.   Psychiatric:  Behavior, mood, and affect are appropriate.       ASSESSMENT/PLAN:    1. Primary insomnia  Chronic issue for patient. Discussed benefits, risks and alternatives to medication. Emphasized importance of maintaining good sleep hygiene including: no caffeine after 3pm, no napping, using bedroom only for sleep or sex, no TV or phones before bed. Prescription for Ambien filled. Continue to monitor. Have reviewed  no signs of abuse or misuse.    - zolpidem (AMBIEN) 10 MG Tab; Take 1 Tab by mouth at bedtime as needed for Sleep for up to 30 days.  Dispense: 30 Tab; Refill: 2    2. Obesity (BMI 30-39.9)  Chronic, uncontrolled, patient advised to pursue lifestyle changes, particularly cardiovascular exercise and increasing proportion of plant-based nutrition.    - Patient identified as having weight management issue.  Appropriate orders and counseling given.    3. Vertigo  New to me chronic problem. Likely BPPV. Intermittent. Have provided handout for home epley maneuvers which have worked well for patient in the past.  Declines further workup at this time but if symptoms persist he will return for evaluation.       Return if symptoms worsen or fail to improve, for Routine Follow up.       Please note that this dictation was created using voice recognition software. I have made every reasonable attempt to correct obvious errors, but I expect that there are errors of grammar and possibly content  that I did not discover before finalizing the note.

## 2020-02-03 ENCOUNTER — HOSPITAL ENCOUNTER (OUTPATIENT)
Dept: LAB | Facility: MEDICAL CENTER | Age: 40
End: 2020-02-03
Attending: INTERNAL MEDICINE
Payer: COMMERCIAL

## 2020-02-03 LAB
ALBUMIN SERPL BCP-MCNC: 4.4 G/DL (ref 3.2–4.9)
ALBUMIN/GLOB SERPL: 1.2 G/DL
ALP SERPL-CCNC: 52 U/L (ref 30–99)
ALT SERPL-CCNC: 26 U/L (ref 2–50)
ANION GAP SERPL CALC-SCNC: 6 MMOL/L (ref 0–11.9)
AST SERPL-CCNC: 21 U/L (ref 12–45)
BASOPHILS # BLD AUTO: 0.8 % (ref 0–1.8)
BASOPHILS # BLD: 0.04 K/UL (ref 0–0.12)
BILIRUB SERPL-MCNC: 0.7 MG/DL (ref 0.1–1.5)
BUN SERPL-MCNC: 19 MG/DL (ref 8–22)
CALCIUM SERPL-MCNC: 10 MG/DL (ref 8.5–10.5)
CHLORIDE SERPL-SCNC: 102 MMOL/L (ref 96–112)
CO2 SERPL-SCNC: 29 MMOL/L (ref 20–33)
CREAT SERPL-MCNC: 0.93 MG/DL (ref 0.5–1.4)
EOSINOPHIL # BLD AUTO: 0.33 K/UL (ref 0–0.51)
EOSINOPHIL NFR BLD: 6.5 % (ref 0–6.9)
ERYTHROCYTE [DISTWIDTH] IN BLOOD BY AUTOMATED COUNT: 41.9 FL (ref 35.9–50)
GLOBULIN SER CALC-MCNC: 3.7 G/DL (ref 1.9–3.5)
GLUCOSE SERPL-MCNC: 84 MG/DL (ref 65–99)
HCT VFR BLD AUTO: 46 % (ref 42–52)
HGB BLD-MCNC: 15.8 G/DL (ref 14–18)
IMM GRANULOCYTES # BLD AUTO: 0 K/UL (ref 0–0.11)
IMM GRANULOCYTES NFR BLD AUTO: 0 % (ref 0–0.9)
LYMPHOCYTES # BLD AUTO: 2.21 K/UL (ref 1–4.8)
LYMPHOCYTES NFR BLD: 43.7 % (ref 22–41)
MCH RBC QN AUTO: 32.8 PG (ref 27–33)
MCHC RBC AUTO-ENTMCNC: 34.3 G/DL (ref 33.7–35.3)
MCV RBC AUTO: 95.6 FL (ref 81.4–97.8)
MONOCYTES # BLD AUTO: 0.65 K/UL (ref 0–0.85)
MONOCYTES NFR BLD AUTO: 12.8 % (ref 0–13.4)
NEUTROPHILS # BLD AUTO: 1.83 K/UL (ref 1.82–7.42)
NEUTROPHILS NFR BLD: 36.2 % (ref 44–72)
NRBC # BLD AUTO: 0 K/UL
NRBC BLD-RTO: 0 /100 WBC
PLATELET # BLD AUTO: 276 K/UL (ref 164–446)
PMV BLD AUTO: 9.1 FL (ref 9–12.9)
POTASSIUM SERPL-SCNC: 4.1 MMOL/L (ref 3.6–5.5)
PROT SERPL-MCNC: 8.1 G/DL (ref 6–8.2)
RBC # BLD AUTO: 4.81 M/UL (ref 4.7–6.1)
SODIUM SERPL-SCNC: 137 MMOL/L (ref 135–145)
WBC # BLD AUTO: 5.1 K/UL (ref 4.8–10.8)

## 2020-02-03 PROCEDURE — 36415 COLL VENOUS BLD VENIPUNCTURE: CPT

## 2020-02-03 PROCEDURE — 80053 COMPREHEN METABOLIC PANEL: CPT

## 2020-02-03 PROCEDURE — 85025 COMPLETE CBC W/AUTO DIFF WBC: CPT

## 2020-09-30 ENCOUNTER — OFFICE VISIT (OUTPATIENT)
Dept: MEDICAL GROUP | Facility: LAB | Age: 40
End: 2020-09-30
Payer: COMMERCIAL

## 2020-09-30 VITALS
SYSTOLIC BLOOD PRESSURE: 116 MMHG | HEIGHT: 69 IN | BODY MASS INDEX: 33.47 KG/M2 | TEMPERATURE: 98.2 F | RESPIRATION RATE: 12 BRPM | HEART RATE: 80 BPM | WEIGHT: 226 LBS | OXYGEN SATURATION: 95 % | DIASTOLIC BLOOD PRESSURE: 80 MMHG

## 2020-09-30 DIAGNOSIS — F51.01 PRIMARY INSOMNIA: ICD-10-CM

## 2020-09-30 DIAGNOSIS — Z13.6 SCREENING FOR CARDIOVASCULAR CONDITION: ICD-10-CM

## 2020-09-30 DIAGNOSIS — K50.80 CROHN'S DISEASE OF BOTH SMALL AND LARGE INTESTINE WITHOUT COMPLICATION (HCC): ICD-10-CM

## 2020-09-30 DIAGNOSIS — Z23 NEED FOR VACCINATION: ICD-10-CM

## 2020-09-30 PROBLEM — R06.2 WHEEZING: Status: RESOLVED | Noted: 2017-03-14 | Resolved: 2020-09-30

## 2020-09-30 PROCEDURE — 90471 IMMUNIZATION ADMIN: CPT | Performed by: FAMILY MEDICINE

## 2020-09-30 PROCEDURE — 90686 IIV4 VACC NO PRSV 0.5 ML IM: CPT | Performed by: FAMILY MEDICINE

## 2020-09-30 PROCEDURE — 99214 OFFICE O/P EST MOD 30 MIN: CPT | Mod: 25 | Performed by: FAMILY MEDICINE

## 2020-09-30 RX ORDER — ZOLPIDEM TARTRATE 10 MG/1
10 TABLET ORAL NIGHTLY PRN
Qty: 30 TAB | Refills: 0 | Status: SHIPPED | OUTPATIENT
Start: 2020-09-30 | End: 2020-10-30

## 2020-09-30 ASSESSMENT — ENCOUNTER SYMPTOMS
PALPITATIONS: 0
ABDOMINAL PAIN: 0
HEADACHES: 0
WHEEZING: 0
FOCAL WEAKNESS: 0
DEPRESSION: 0
MYALGIAS: 0
DIARRHEA: 0
CONSTIPATION: 0
BLURRED VISION: 0
FEVER: 0
COUGH: 0
NAUSEA: 0
VOMITING: 0
SORE THROAT: 0
DIZZINESS: 0
CHILLS: 0
SHORTNESS OF BREATH: 0

## 2020-09-30 ASSESSMENT — FIBROSIS 4 INDEX: FIB4 SCORE: 0.6

## 2020-09-30 NOTE — PROGRESS NOTES
Andres Thacker is a 39 y.o. male here to establish care and discuss chronic conditions.    HPI:      Insomnia  He occasionally uses Ambien for times when he cannot sleep due to stressors or when he needs to get good night sleep.  30 tabs will last him up to 8 or 9 months.  He would like a refill of this.    Crohn's  Follows closely with GI.  Reports good control on on azathioprine and Humira.    Current medicines (including changes today)  Current Outpatient Medications   Medication Sig Dispense Refill   • fluticasone (FLONASE) 50 MCG/ACT nasal spray Spray 2 Sprays in nose every day. 1 Bottle 3   • gabapentin (NEURONTIN) 300 MG Cap Take 1 Cap by mouth 2 Times a Day. 180 Cap 2   • azathioprine (IMURAN) 50 MG Tab Take 1 Tab by mouth every day. 30 Tab    • Adalimumab (HUMIRA) 40 MG/0.8ML Prefilled Syringe Kit Inject  as instructed.       No current facility-administered medications for this visit.      He  has a past medical history of Allergy and Crohn's disease (HCC).  He  has a past surgical history that includes vasectomy.  Social History     Tobacco Use   • Smoking status: Never Smoker   • Smokeless tobacco: Never Used   Substance Use Topics   • Alcohol use: Yes     Comment: occasional    • Drug use: No     Social History     Social History Narrative   • Not on file     Family History   Problem Relation Age of Onset   • Other Mother         eczema   • No Known Problems Father      Family Status   Relation Name Status   • Mo  Alive   • Fa  Alive       ROS  Review of Systems   Constitutional: Negative for chills and fever.   HENT: Negative for congestion and sore throat.    Eyes: Negative for blurred vision.   Respiratory: Negative for cough, shortness of breath and wheezing.    Cardiovascular: Negative for chest pain and palpitations.   Gastrointestinal: Negative for abdominal pain, constipation, diarrhea, nausea and vomiting.   Genitourinary: Negative for dysuria and urgency.   Musculoskeletal: Negative for  "joint pain and myalgias.   Skin: Negative for rash.   Neurological: Negative for dizziness, focal weakness and headaches.   Psychiatric/Behavioral: Negative for depression.   All other systems reviewed and are negative.        Objective:     Physical Exam:  /80 (BP Location: Left arm, Patient Position: Sitting, BP Cuff Size: Large adult)   Pulse 80   Temp 36.8 °C (98.2 °F)   Resp 12   Ht 1.753 m (5' 9\")   Wt 102.5 kg (226 lb)   SpO2 95%  Body mass index is 33.37 kg/m².  Constitutional: Alert. Well appearing. No distress.  Skin: Warm, dry, good turgor, no visible rashes.  Eye: Equal, round and reactive to light, conjunctiva clear, lids normal.  ENMT: Moist mucous membranes. Normal dentition. Oropharynx clear.  Neck: Trachea midline, no masses, no thyromegaly.   Respiratory: Normal effort. Lungs are clear to auscultation bilaterally.  Cardiovascular: Regular rate and rhythm. Normal S1/S2. No murmurs, rubs or gallops.   Neuro: Moves all four extremities. No facial droop.  Psych: Answers questions appropriately. Normal affect and mood.      Assessment and Plan:     1. Crohn's disease of both small and large intestine without complication (HCC)  Chronic issue.  Reports this is well controlled with azathioprine and Humira.  He follows closely with GI.    2. Primary insomnia  Chronic issue.  Very intermittent Ambien use.  This is refilled today.  PDMP reviewed.  - zolpidem (AMBIEN) 10 MG Tab; Take 1 Tab by mouth at bedtime as needed for Sleep for up to 30 days.  Dispense: 30 Tab; Refill: 0    3. Screening for cardiovascular condition  Lipid screening.  - Lipid Profile; Future    4. Need for vaccination  - Influenza Vaccine Quad Injection (PF)      Records requested from previous PCP.  Follow up: No follow-ups on file.         PLEASE NOTE: This note was created using voice recognition software.   "

## 2020-11-23 ENCOUNTER — HOSPITAL ENCOUNTER (OUTPATIENT)
Dept: LAB | Facility: MEDICAL CENTER | Age: 40
End: 2020-11-23
Attending: FAMILY MEDICINE
Payer: COMMERCIAL

## 2020-11-23 ENCOUNTER — HOSPITAL ENCOUNTER (OUTPATIENT)
Dept: LAB | Facility: MEDICAL CENTER | Age: 40
End: 2020-11-23
Attending: INTERNAL MEDICINE
Payer: COMMERCIAL

## 2020-11-23 DIAGNOSIS — Z13.6 SCREENING FOR CARDIOVASCULAR CONDITION: ICD-10-CM

## 2020-11-23 LAB
ALBUMIN SERPL BCP-MCNC: 3.9 G/DL (ref 3.2–4.9)
ALBUMIN/GLOB SERPL: 1.1 G/DL
ALP SERPL-CCNC: 63 U/L (ref 30–99)
ALT SERPL-CCNC: 27 U/L (ref 2–50)
ANION GAP SERPL CALC-SCNC: 8 MMOL/L (ref 7–16)
AST SERPL-CCNC: 23 U/L (ref 12–45)
BASOPHILS # BLD AUTO: 1.2 % (ref 0–1.8)
BASOPHILS # BLD: 0.06 K/UL (ref 0–0.12)
BILIRUB SERPL-MCNC: 0.4 MG/DL (ref 0.1–1.5)
BUN SERPL-MCNC: 16 MG/DL (ref 8–22)
CALCIUM SERPL-MCNC: 9.3 MG/DL (ref 8.5–10.5)
CHLORIDE SERPL-SCNC: 106 MMOL/L (ref 96–112)
CHOLEST SERPL-MCNC: 149 MG/DL (ref 100–199)
CO2 SERPL-SCNC: 26 MMOL/L (ref 20–33)
CREAT SERPL-MCNC: 0.9 MG/DL (ref 0.5–1.4)
EOSINOPHIL # BLD AUTO: 0.42 K/UL (ref 0–0.51)
EOSINOPHIL NFR BLD: 8.4 % (ref 0–6.9)
ERYTHROCYTE [DISTWIDTH] IN BLOOD BY AUTOMATED COUNT: 46.5 FL (ref 35.9–50)
GLOBULIN SER CALC-MCNC: 3.4 G/DL (ref 1.9–3.5)
GLUCOSE SERPL-MCNC: 102 MG/DL (ref 65–99)
HCT VFR BLD AUTO: 43.5 % (ref 42–52)
HDLC SERPL-MCNC: 48 MG/DL
HGB BLD-MCNC: 14.8 G/DL (ref 14–18)
IMM GRANULOCYTES # BLD AUTO: 0 K/UL (ref 0–0.11)
IMM GRANULOCYTES NFR BLD AUTO: 0 % (ref 0–0.9)
LDLC SERPL CALC-MCNC: 63 MG/DL
LYMPHOCYTES # BLD AUTO: 1.91 K/UL (ref 1–4.8)
LYMPHOCYTES NFR BLD: 38.1 % (ref 22–41)
MCH RBC QN AUTO: 33.3 PG (ref 27–33)
MCHC RBC AUTO-ENTMCNC: 34 G/DL (ref 33.7–35.3)
MCV RBC AUTO: 97.8 FL (ref 81.4–97.8)
MONOCYTES # BLD AUTO: 0.51 K/UL (ref 0–0.85)
MONOCYTES NFR BLD AUTO: 10.2 % (ref 0–13.4)
NEUTROPHILS # BLD AUTO: 2.11 K/UL (ref 1.82–7.42)
NEUTROPHILS NFR BLD: 42.1 % (ref 44–72)
NRBC # BLD AUTO: 0 K/UL
NRBC BLD-RTO: 0 /100 WBC
PLATELET # BLD AUTO: 282 K/UL (ref 164–446)
PMV BLD AUTO: 8.9 FL (ref 9–12.9)
POTASSIUM SERPL-SCNC: 3.9 MMOL/L (ref 3.6–5.5)
PROT SERPL-MCNC: 7.3 G/DL (ref 6–8.2)
RBC # BLD AUTO: 4.45 M/UL (ref 4.7–6.1)
SODIUM SERPL-SCNC: 140 MMOL/L (ref 135–145)
TRIGL SERPL-MCNC: 189 MG/DL (ref 0–149)
WBC # BLD AUTO: 5 K/UL (ref 4.8–10.8)

## 2020-11-23 PROCEDURE — 36415 COLL VENOUS BLD VENIPUNCTURE: CPT

## 2020-11-23 PROCEDURE — 80061 LIPID PANEL: CPT

## 2020-11-23 PROCEDURE — 85025 COMPLETE CBC W/AUTO DIFF WBC: CPT

## 2020-11-23 PROCEDURE — 80053 COMPREHEN METABOLIC PANEL: CPT

## 2021-04-13 ENCOUNTER — HOSPITAL ENCOUNTER (OUTPATIENT)
Dept: LAB | Facility: MEDICAL CENTER | Age: 41
End: 2021-04-13
Attending: FAMILY MEDICINE
Payer: COMMERCIAL

## 2021-04-13 DIAGNOSIS — Z11.59 ENCOUNTER FOR SCREENING FOR OTHER VIRAL DISEASES: ICD-10-CM

## 2021-04-13 LAB
COVID ORDER STATUS COVID19: NORMAL
SARS-COV-2 RNA RESP QL NAA+PROBE: NOTDETECTED
SPECIMEN SOURCE: NORMAL

## 2021-04-13 PROCEDURE — U0003 INFECTIOUS AGENT DETECTION BY NUCLEIC ACID (DNA OR RNA); SEVERE ACUTE RESPIRATORY SYNDROME CORONAVIRUS 2 (SARS-COV-2) (CORONAVIRUS DISEASE [COVID-19]), AMPLIFIED PROBE TECHNIQUE, MAKING USE OF HIGH THROUGHPUT TECHNOLOGIES AS DESCRIBED BY CMS-2020-01-R: HCPCS

## 2021-04-13 PROCEDURE — C9803 HOPD COVID-19 SPEC COLLECT: HCPCS

## 2021-04-13 PROCEDURE — U0005 INFEC AGEN DETEC AMPLI PROBE: HCPCS

## 2021-05-28 ENCOUNTER — HOSPITAL ENCOUNTER (OUTPATIENT)
Dept: LAB | Facility: MEDICAL CENTER | Age: 41
End: 2021-05-28
Attending: INTERNAL MEDICINE
Payer: COMMERCIAL

## 2021-06-11 ENCOUNTER — HOSPITAL ENCOUNTER (OUTPATIENT)
Dept: LAB | Facility: MEDICAL CENTER | Age: 41
End: 2021-06-11
Attending: INTERNAL MEDICINE
Payer: COMMERCIAL

## 2021-06-11 LAB
ALBUMIN SERPL BCP-MCNC: 4.3 G/DL (ref 3.2–4.9)
ALBUMIN/GLOB SERPL: 1.3 G/DL
ALP SERPL-CCNC: 64 U/L (ref 30–99)
ALT SERPL-CCNC: 42 U/L (ref 2–50)
ANION GAP SERPL CALC-SCNC: 13 MMOL/L (ref 7–16)
AST SERPL-CCNC: 30 U/L (ref 12–45)
BASOPHILS # BLD AUTO: 1.1 % (ref 0–1.8)
BASOPHILS # BLD: 0.06 K/UL (ref 0–0.12)
BILIRUB SERPL-MCNC: 0.6 MG/DL (ref 0.1–1.5)
BUN SERPL-MCNC: 14 MG/DL (ref 8–22)
CALCIUM SERPL-MCNC: 9.7 MG/DL (ref 8.5–10.5)
CHLORIDE SERPL-SCNC: 104 MMOL/L (ref 96–112)
CO2 SERPL-SCNC: 23 MMOL/L (ref 20–33)
CREAT SERPL-MCNC: 0.91 MG/DL (ref 0.5–1.4)
EOSINOPHIL # BLD AUTO: 0.25 K/UL (ref 0–0.51)
EOSINOPHIL NFR BLD: 4.7 % (ref 0–6.9)
ERYTHROCYTE [DISTWIDTH] IN BLOOD BY AUTOMATED COUNT: 48.8 FL (ref 35.9–50)
GLOBULIN SER CALC-MCNC: 3.3 G/DL (ref 1.9–3.5)
GLUCOSE SERPL-MCNC: 82 MG/DL (ref 65–99)
HCT VFR BLD AUTO: 42.1 % (ref 42–52)
HGB BLD-MCNC: 14.5 G/DL (ref 14–18)
IMM GRANULOCYTES # BLD AUTO: 0.01 K/UL (ref 0–0.11)
IMM GRANULOCYTES NFR BLD AUTO: 0.2 % (ref 0–0.9)
LYMPHOCYTES # BLD AUTO: 1.96 K/UL (ref 1–4.8)
LYMPHOCYTES NFR BLD: 36.6 % (ref 22–41)
MCH RBC QN AUTO: 34 PG (ref 27–33)
MCHC RBC AUTO-ENTMCNC: 34.4 G/DL (ref 33.7–35.3)
MCV RBC AUTO: 98.8 FL (ref 81.4–97.8)
MONOCYTES # BLD AUTO: 0.44 K/UL (ref 0–0.85)
MONOCYTES NFR BLD AUTO: 8.2 % (ref 0–13.4)
NEUTROPHILS # BLD AUTO: 2.63 K/UL (ref 1.82–7.42)
NEUTROPHILS NFR BLD: 49.2 % (ref 44–72)
NRBC # BLD AUTO: 0 K/UL
NRBC BLD-RTO: 0 /100 WBC
PLATELET # BLD AUTO: 267 K/UL (ref 164–446)
PMV BLD AUTO: 9.3 FL (ref 9–12.9)
POTASSIUM SERPL-SCNC: 4.2 MMOL/L (ref 3.6–5.5)
PROT SERPL-MCNC: 7.6 G/DL (ref 6–8.2)
RBC # BLD AUTO: 4.26 M/UL (ref 4.7–6.1)
SODIUM SERPL-SCNC: 140 MMOL/L (ref 135–145)
WBC # BLD AUTO: 5.4 K/UL (ref 4.8–10.8)

## 2021-06-11 PROCEDURE — 80053 COMPREHEN METABOLIC PANEL: CPT

## 2021-06-11 PROCEDURE — 36415 COLL VENOUS BLD VENIPUNCTURE: CPT

## 2021-06-11 PROCEDURE — 85025 COMPLETE CBC W/AUTO DIFF WBC: CPT

## 2021-07-04 ENCOUNTER — APPOINTMENT (OUTPATIENT)
Dept: RADIOLOGY | Facility: MEDICAL CENTER | Age: 41
End: 2021-07-04
Attending: EMERGENCY MEDICINE
Payer: COMMERCIAL

## 2021-07-04 ENCOUNTER — HOSPITAL ENCOUNTER (EMERGENCY)
Facility: MEDICAL CENTER | Age: 41
End: 2021-07-04
Attending: EMERGENCY MEDICINE
Payer: COMMERCIAL

## 2021-07-04 VITALS
HEIGHT: 69 IN | OXYGEN SATURATION: 94 % | DIASTOLIC BLOOD PRESSURE: 80 MMHG | WEIGHT: 212.3 LBS | SYSTOLIC BLOOD PRESSURE: 127 MMHG | HEART RATE: 78 BPM | RESPIRATION RATE: 12 BRPM | BODY MASS INDEX: 31.44 KG/M2 | TEMPERATURE: 98 F

## 2021-07-04 DIAGNOSIS — R19.7 DIARRHEA, UNSPECIFIED TYPE: ICD-10-CM

## 2021-07-04 DIAGNOSIS — R10.10 PAIN OF UPPER ABDOMEN: ICD-10-CM

## 2021-07-04 LAB
ALBUMIN SERPL BCP-MCNC: 4.4 G/DL (ref 3.2–4.9)
ALBUMIN/GLOB SERPL: 1.3 G/DL
ALP SERPL-CCNC: 55 U/L (ref 30–99)
ALT SERPL-CCNC: 46 U/L (ref 2–50)
ANION GAP SERPL CALC-SCNC: 10 MMOL/L (ref 7–16)
AST SERPL-CCNC: 37 U/L (ref 12–45)
BASOPHILS # BLD AUTO: 0.3 % (ref 0–1.8)
BASOPHILS # BLD: 0.02 K/UL (ref 0–0.12)
BILIRUB SERPL-MCNC: 1.2 MG/DL (ref 0.1–1.5)
BUN SERPL-MCNC: 20 MG/DL (ref 8–22)
CALCIUM SERPL-MCNC: 9.7 MG/DL (ref 8.4–10.2)
CHLORIDE SERPL-SCNC: 101 MMOL/L (ref 96–112)
CO2 SERPL-SCNC: 26 MMOL/L (ref 20–33)
CREAT SERPL-MCNC: 0.89 MG/DL (ref 0.5–1.4)
CRP SERPL HS-MCNC: 1.76 MG/DL (ref 0–0.75)
EOSINOPHIL # BLD AUTO: 0.04 K/UL (ref 0–0.51)
EOSINOPHIL NFR BLD: 0.6 % (ref 0–6.9)
ERYTHROCYTE [DISTWIDTH] IN BLOOD BY AUTOMATED COUNT: 50.3 FL (ref 35.9–50)
ERYTHROCYTE [SEDIMENTATION RATE] IN BLOOD BY WESTERGREN METHOD: 4 MM/HOUR (ref 0–15)
GLOBULIN SER CALC-MCNC: 3.5 G/DL (ref 1.9–3.5)
GLUCOSE SERPL-MCNC: 108 MG/DL (ref 65–99)
HCT VFR BLD AUTO: 45.7 % (ref 42–52)
HGB BLD-MCNC: 15.7 G/DL (ref 14–18)
IMM GRANULOCYTES # BLD AUTO: 0.03 K/UL (ref 0–0.11)
IMM GRANULOCYTES NFR BLD AUTO: 0.4 % (ref 0–0.9)
LIPASE SERPL-CCNC: 13 U/L (ref 7–58)
LYMPHOCYTES # BLD AUTO: 0.78 K/UL (ref 1–4.8)
LYMPHOCYTES NFR BLD: 11 % (ref 22–41)
MCH RBC QN AUTO: 34.4 PG (ref 27–33)
MCHC RBC AUTO-ENTMCNC: 34.4 G/DL (ref 33.7–35.3)
MCV RBC AUTO: 100 FL (ref 81.4–97.8)
MONOCYTES # BLD AUTO: 0.62 K/UL (ref 0–0.85)
MONOCYTES NFR BLD AUTO: 8.7 % (ref 0–13.4)
NEUTROPHILS # BLD AUTO: 5.62 K/UL (ref 1.82–7.42)
NEUTROPHILS NFR BLD: 79 % (ref 44–72)
NRBC # BLD AUTO: 0 K/UL
NRBC BLD-RTO: 0 /100 WBC
PLATELET # BLD AUTO: 264 K/UL (ref 164–446)
PMV BLD AUTO: 9 FL (ref 9–12.9)
POTASSIUM SERPL-SCNC: 4 MMOL/L (ref 3.6–5.5)
PROT SERPL-MCNC: 7.9 G/DL (ref 6–8.2)
RBC # BLD AUTO: 4.57 M/UL (ref 4.7–6.1)
SODIUM SERPL-SCNC: 137 MMOL/L (ref 135–145)
WBC # BLD AUTO: 7.1 K/UL (ref 4.8–10.8)

## 2021-07-04 PROCEDURE — 80053 COMPREHEN METABOLIC PANEL: CPT

## 2021-07-04 PROCEDURE — 96374 THER/PROPH/DIAG INJ IV PUSH: CPT

## 2021-07-04 PROCEDURE — 85652 RBC SED RATE AUTOMATED: CPT

## 2021-07-04 PROCEDURE — 700111 HCHG RX REV CODE 636 W/ 250 OVERRIDE (IP): Performed by: EMERGENCY MEDICINE

## 2021-07-04 PROCEDURE — 86140 C-REACTIVE PROTEIN: CPT

## 2021-07-04 PROCEDURE — 85025 COMPLETE CBC W/AUTO DIFF WBC: CPT

## 2021-07-04 PROCEDURE — 700105 HCHG RX REV CODE 258: Performed by: EMERGENCY MEDICINE

## 2021-07-04 PROCEDURE — 74177 CT ABD & PELVIS W/CONTRAST: CPT

## 2021-07-04 PROCEDURE — 83690 ASSAY OF LIPASE: CPT

## 2021-07-04 PROCEDURE — 700117 HCHG RX CONTRAST REV CODE 255: Performed by: EMERGENCY MEDICINE

## 2021-07-04 PROCEDURE — 99285 EMERGENCY DEPT VISIT HI MDM: CPT

## 2021-07-04 PROCEDURE — 96375 TX/PRO/DX INJ NEW DRUG ADDON: CPT

## 2021-07-04 RX ORDER — MORPHINE SULFATE 4 MG/ML
4 INJECTION, SOLUTION INTRAMUSCULAR; INTRAVENOUS ONCE
Status: COMPLETED | OUTPATIENT
Start: 2021-07-04 | End: 2021-07-04

## 2021-07-04 RX ORDER — ONDANSETRON 2 MG/ML
4 INJECTION INTRAMUSCULAR; INTRAVENOUS ONCE
Status: COMPLETED | OUTPATIENT
Start: 2021-07-04 | End: 2021-07-04

## 2021-07-04 RX ORDER — OXYCODONE HYDROCHLORIDE AND ACETAMINOPHEN 5; 325 MG/1; MG/1
1 TABLET ORAL EVERY 6 HOURS PRN
Qty: 15 TABLET | Refills: 0 | Status: SHIPPED | OUTPATIENT
Start: 2021-07-04 | End: 2021-07-08

## 2021-07-04 RX ORDER — SODIUM CHLORIDE, SODIUM LACTATE, POTASSIUM CHLORIDE, CALCIUM CHLORIDE 600; 310; 30; 20 MG/100ML; MG/100ML; MG/100ML; MG/100ML
1000 INJECTION, SOLUTION INTRAVENOUS ONCE
Status: COMPLETED | OUTPATIENT
Start: 2021-07-04 | End: 2021-07-04

## 2021-07-04 RX ORDER — ONDANSETRON 4 MG/1
4 TABLET, ORALLY DISINTEGRATING ORAL EVERY 6 HOURS PRN
Qty: 10 TABLET | Refills: 0 | Status: SHIPPED | OUTPATIENT
Start: 2021-07-04 | End: 2023-03-29

## 2021-07-04 RX ADMIN — MORPHINE SULFATE 4 MG: 4 INJECTION INTRAVENOUS at 20:55

## 2021-07-04 RX ADMIN — SODIUM CHLORIDE, POTASSIUM CHLORIDE, SODIUM LACTATE AND CALCIUM CHLORIDE 1000 ML: 600; 310; 30; 20 INJECTION, SOLUTION INTRAVENOUS at 20:33

## 2021-07-04 RX ADMIN — IOHEXOL 100 ML: 350 INJECTION, SOLUTION INTRAVENOUS at 21:14

## 2021-07-04 RX ADMIN — MORPHINE SULFATE 4 MG: 4 INJECTION INTRAVENOUS at 20:30

## 2021-07-04 RX ADMIN — ONDANSETRON 4 MG: 2 INJECTION INTRAMUSCULAR; INTRAVENOUS at 20:29

## 2021-07-04 ASSESSMENT — PAIN DESCRIPTION - PAIN TYPE
TYPE: ACUTE PAIN
TYPE: ACUTE PAIN

## 2021-07-04 ASSESSMENT — FIBROSIS 4 INDEX: FIB4 SCORE: 0.71

## 2021-07-05 NOTE — ED NOTES
Pt reports last night in the middle of the night had an episode of diarrhea with a little bit of pain above umbilicius. Pt was able to go back to sleep; and then woke up and had another episode of diarrhea followed by stomach aches. Pt has Crohns disease. Pt reports the pain became less intermittent and more constant and pt not able to ignore. Pt does not feel like this is a Chron's exacerbation.     Pt took some Tums and Pepto.     Pt got back Friday from [place] Remainder of people on the trip do not have s/s.

## 2021-07-05 NOTE — ED PROVIDER NOTES
ED Provider Note    CHIEF COMPLAINT  Chief Complaint   Patient presents with   • Abdominal Pain     Reports abd pain above umbilicus since last night.    • Diarrhea     Reports diarrhea started last night as well. Denies bloody stools, fevers, or emesis.       HPI  Andres Thacker is a 40 y.o. male with a history of Crohn's disease who presents with complaints of progressive mid abdominal pain since last night.  The patient recently went to an all-inclusive resort in [facility] and returned 2 days ago.  He went with number of other people and none have been sick with similar symptoms.  The patient says he developed an episode of diarrhea and some sharp abdominal pain to the mid to upper abdomen last night, that resolved, and he was able to go back to sleep.  He then continued having intermittent bouts of pain this morning and throughout the afternoon and small amounts of diarrhea.  He said he did not eat anything today because he thought it worsened the diarrhea.  He finally decided to try a banana and some juice, and subsequently had very watery diarrhea.  He also developed worsening sharp abdominal pains to the upper mid abdomen.  He says it feels different from his Crohn's disease where he usually has severe crampy abdominal pain and usually passes some blood.  The patient is on Humira and Imuran for his Crohn's disease, is [facility]  The patient denies fever but has had chills.  He has had no sore throat, cough, congestion, any difficulty breathing.  He has had no urinary symptoms.  He denies any blood in his stools or any black stools.    REVIEW OF SYSTEMS  See HPI for further details. All other systems are negative.     PAST MEDICAL HISTORY  Past Medical History:   Diagnosis Date   • Allergy    • Crohn's disease (HCC)        FAMILY HISTORY  Family History   Problem Relation Age of Onset   • Other Mother         eczema   • No Known Problems Father        SOCIAL  "HISTORY  Social History     Tobacco Use   • Smoking status: Never Smoker   • Smokeless tobacco: Never Used   Substance Use Topics   • Alcohol use: Yes     Comment: 1x daily   • Drug use: No      Social History     Substance and Sexual Activity   Drug Use No       SURGICAL HISTORY  Past Surgical History:   Procedure Laterality Date   • VASECTOMY         CURRENT MEDICATIONS  Home Medications     Reviewed by Flavia Lorenzana R.N. (Registered Nurse) on 07/04/21 at 2016  Med List Status: Complete    Medication Last Dose Status    Adalimumab (HUMIRA) 40 MG/0.8ML Prefilled Syringe Kit None Active    azathioprine (IMURAN) 50 MG Tab None Active    fluticasone (FLONASE) 50 MCG/ACT nasal spray None Flagged for Removal                ALLERGIES  Allergies   Allergen Reactions   • Pcn [Penicillins] Hives   • Remicade [Infliximab] Anaphylaxis       PHYSICAL EXAM0  VITAL SIGNS: Blood Pressure 127/80   Pulse 78   Temperature 36.7 °C (98.1 °F) (Temporal)   Respiration 12   Height 1.753 m (5' 9\")   Weight 96.3 kg (212 lb 4.9 oz)   Oxygen Saturation 94%   Body Mass Index 31.35 kg/m²   Constitutional: Awake, alert, in no acute distress, Non-toxic appearance.   HENT: Atraumatic. Bilateral external ears normal, mucous membranes are dry, throat nonerythematous without exudates, nose is normal.  Eyes: PERRL, EOMI, conjunctiva moist, noninjected.  Neck: Nontender, Normal range of motion, No nuchal rigidity, No stridor.   Lymphatic: No lymphadenopathy noted.   Cardiovascular: Regular rate and rhythm, no murmurs, rubs, gallops.  Thorax & Lungs:  Good breath sounds bilaterally, no wheezes, rales, or retractions.  No chest tenderness.  Abdomen: Bowel sounds normal, Soft, nondistended, there is a mild tenderness around the periumbilical area, no tenderness to the epigastrium, right upper quadrant, or lower abdomen, no rebound, guarding, or masses.  Back: No CVA or spinal tenderness.  Extremities: Intact distal pulses, No edema, No tenderness. "   Skin: Warm, Dry, No rashes.   Musculoskeletal: No joint swelling or tenderness.  Neurologic: Alert & oriented x 3, sensory and motor function normal. No focal deficits.   Psychiatric: Affect normal, Judgment normal, Mood normal.       LABS  Labs Reviewed   CBC WITH DIFFERENTIAL - Abnormal; Notable for the following components:       Result Value    RBC 4.57 (*)     .0 (*)     MCH 34.4 (*)     RDW 50.3 (*)     Neutrophils-Polys 79.00 (*)     Lymphocytes 11.00 (*)     Lymphs (Absolute) 0.78 (*)     All other components within normal limits   COMP METABOLIC PANEL - Abnormal; Notable for the following components:    Glucose 108 (*)     All other components within normal limits   CRP QUANTITIVE (NON-CARDIAC) - Abnormal; Notable for the following components:    Stat C-Reactive Protein 1.76 (*)     All other components within normal limits   LIPASE   SED RATE   ESTIMATED GFR       All labs reviewed by me.      RADIOLOGY/PROCEDURES  CT-ABDOMEN-PELVIS WITH   Final Result      1.  Increased small bowel fluid suggesting gastroenteritis.   2.  No bowel obstruction or perforation.   3.  Normal appendix.   4.  Mild fatty infiltration of liver.          The radiologist's interpretations of all radiological studies have been reviewed by me.        COURSE & MEDICAL DECISION MAKING  Pertinent Labs & Imaging studies reviewed. (See chart for details)  The patient presents with the above complaints.  Clinically appears dehydrated with dry mucous membranes, poor p.o. intake.  IV was placed he was given a liter of lactated Ringer's, morphine, and Zofran.  CBC showed a white count of 7100, hemoglobin 15.7, with 79% polys, 11% lymphs, 7 Tatian rate normal at 4, chemistry shows normal electrolytes, glucose 108, normal renal function and liver function test, normal lipase.  C-reactive protein mildly elevated 1.76.  CT scan of the abdomen/pelvis IV contrast was obtained which shows some increased small bowel fluid suggesting  gastroenteritis per radiology.  There is no inflammatory changes noted appendix appears normal, mild fatty infiltration of the liver.  The patient required 2 doses of morphine and is currently resting comfortably.  Exam of his abdomen is benign.  Dr. Mendoza gastroenterology was consulted, and then agreed that this likely sounded viral.  He did not feel patient need to be on any antibiotics.  The patient will be discharged with instructions to return to the ER for any worsening pain, fever, vomiting, bloody stools, or any other problems.  He is to call Dr. Schwartz on Monday for an appointment.    FINAL IMPRESSION  1. Pain of upper abdomen    2. Diarrhea, unspecified type          Electronically signed by: David Ac M.D., 7/4/2021 8:24 PM

## 2021-07-05 NOTE — ED NOTES
Discharge instructions & Controlled Substances Use Informed Consent form reviewed with patient. AVS & Controlled Substances Use Informed Consent form signed by patient. PIV removed. Prescriptions electronically sent to pharmacy of choice. Patient understands to return to ED for worsening symptoms. All questions answered at this time. Patient ambulated to exit with belongings & visitor. Patient in stable condition with no signs of distress. Patient agreeable to discharge instructions.

## 2021-11-09 ENCOUNTER — APPOINTMENT (OUTPATIENT)
Dept: MEDICAL GROUP | Facility: LAB | Age: 41
End: 2021-11-09
Payer: COMMERCIAL

## 2021-11-16 ENCOUNTER — NON-PROVIDER VISIT (OUTPATIENT)
Dept: MEDICAL GROUP | Facility: LAB | Age: 41
End: 2021-11-16
Payer: COMMERCIAL

## 2021-11-16 DIAGNOSIS — Z23 NEED FOR VACCINATION: ICD-10-CM

## 2021-11-16 PROCEDURE — 90471 IMMUNIZATION ADMIN: CPT | Performed by: FAMILY MEDICINE

## 2021-11-16 PROCEDURE — 90686 IIV4 VACC NO PRSV 0.5 ML IM: CPT | Performed by: FAMILY MEDICINE

## 2021-11-16 NOTE — PROGRESS NOTES
"Andres Thacker is a 41 y.o. male here for a non-provider visit for:   FLU    Reason for immunization: Annual Flu Vaccine  Immunization records indicate need for vaccine: Yes, confirmed with Epic  Minimum interval has been met for this vaccine: Yes  ABN completed: Yes    VIS Dated   was given to patient: Yes  All IAC Questionnaire questions were answered \"No.\"    Patient tolerated injection and no adverse effects were observed or reported: Yes    Pt scheduled for next dose in series: No  "

## 2022-01-12 ENCOUNTER — PATIENT MESSAGE (OUTPATIENT)
Dept: MEDICAL GROUP | Facility: LAB | Age: 42
End: 2022-01-12

## 2022-01-12 DIAGNOSIS — F51.01 PRIMARY INSOMNIA: ICD-10-CM

## 2022-01-12 RX ORDER — CEPHALEXIN 500 MG/1
500 CAPSULE ORAL 2 TIMES DAILY
Qty: 20 CAPSULE | Refills: 0 | Status: SHIPPED | OUTPATIENT
Start: 2022-01-12 | End: 2022-01-22

## 2022-01-12 NOTE — PROGRESS NOTES
Symptoms consistent with strep pharyngitis and son recently diagnosed with strep pharyngitis.  Given limited availability of appointments and difficulty obtaining further testing at this point we will presumptively treat.

## 2022-01-13 RX ORDER — ZOLPIDEM TARTRATE 10 MG/1
10 TABLET ORAL NIGHTLY PRN
Qty: 10 TABLET | Refills: 0 | Status: SHIPPED | OUTPATIENT
Start: 2022-01-13 | End: 2022-02-12

## 2022-04-21 ENCOUNTER — OFFICE VISIT (OUTPATIENT)
Dept: MEDICAL GROUP | Facility: LAB | Age: 42
End: 2022-04-21
Payer: COMMERCIAL

## 2022-04-21 VITALS
RESPIRATION RATE: 14 BRPM | DIASTOLIC BLOOD PRESSURE: 72 MMHG | HEIGHT: 69 IN | BODY MASS INDEX: 32.14 KG/M2 | OXYGEN SATURATION: 94 % | WEIGHT: 217 LBS | TEMPERATURE: 97 F | HEART RATE: 84 BPM | SYSTOLIC BLOOD PRESSURE: 126 MMHG

## 2022-04-21 DIAGNOSIS — Z13.6 SCREENING FOR CARDIOVASCULAR CONDITION: ICD-10-CM

## 2022-04-21 DIAGNOSIS — L30.9 DERMATITIS: ICD-10-CM

## 2022-04-21 DIAGNOSIS — K50.80 CROHN'S DISEASE OF BOTH SMALL AND LARGE INTESTINE WITHOUT COMPLICATION (HCC): ICD-10-CM

## 2022-04-21 DIAGNOSIS — F51.01 PRIMARY INSOMNIA: ICD-10-CM

## 2022-04-21 DIAGNOSIS — Z23 NEED FOR VACCINATION: ICD-10-CM

## 2022-04-21 DIAGNOSIS — K21.9 GASTROESOPHAGEAL REFLUX DISEASE, UNSPECIFIED WHETHER ESOPHAGITIS PRESENT: ICD-10-CM

## 2022-04-21 PROCEDURE — 90715 TDAP VACCINE 7 YRS/> IM: CPT | Performed by: FAMILY MEDICINE

## 2022-04-21 PROCEDURE — 90471 IMMUNIZATION ADMIN: CPT | Performed by: FAMILY MEDICINE

## 2022-04-21 PROCEDURE — 99214 OFFICE O/P EST MOD 30 MIN: CPT | Mod: 25 | Performed by: FAMILY MEDICINE

## 2022-04-21 PROCEDURE — 90472 IMMUNIZATION ADMIN EACH ADD: CPT | Performed by: FAMILY MEDICINE

## 2022-04-21 PROCEDURE — 90677 PCV20 VACCINE IM: CPT | Performed by: FAMILY MEDICINE

## 2022-04-21 RX ORDER — TRIAMCINOLONE ACETONIDE 1 MG/G
1 CREAM TOPICAL 2 TIMES DAILY
Qty: 28.4 G | Refills: 0 | Status: SHIPPED | OUTPATIENT
Start: 2022-04-21 | End: 2022-08-29

## 2022-04-21 RX ORDER — ZOLPIDEM TARTRATE 10 MG/1
10 TABLET ORAL
Qty: 30 TABLET | Refills: 0 | Status: SHIPPED | OUTPATIENT
Start: 2022-04-21 | End: 2023-02-10 | Stop reason: SDUPTHER

## 2022-04-21 RX ORDER — ZOLPIDEM TARTRATE 10 MG/1
1 TABLET ORAL
COMMUNITY
Start: 2022-01-13 | End: 2022-04-21 | Stop reason: SDUPTHER

## 2022-04-21 RX ORDER — ZOSTER VACCINE RECOMBINANT, ADJUVANTED 50 MCG/0.5
0.5 KIT INTRAMUSCULAR ONCE
Qty: 1 EACH | Refills: 0 | Status: SHIPPED
Start: 2022-04-21 | End: 2022-04-21

## 2022-04-21 ASSESSMENT — FIBROSIS 4 INDEX: FIB4 SCORE: 0.87

## 2022-04-21 ASSESSMENT — PATIENT HEALTH QUESTIONNAIRE - PHQ9: CLINICAL INTERPRETATION OF PHQ2 SCORE: 0

## 2022-04-21 NOTE — PROGRESS NOTES
"Subjective:     CC: Multiple concerns    HPI:   Andres presents today with:    Rash  Intermittent to right upper forehead for years, not itchy or painful.  Does shave the area.  Raised red bumps, no vesicles or crusting.    Urgent care visit  Was seen in urgent care in [place] on 4/14 for chest tightness.  Work-up was reassuring including EKG and was thought this was likely secondary to reflux or heartburn.  He had drank more alcohol than normal the night before and the symptoms did resolve after a few days.  Has not had return of symptoms.  No cardiac history.  No chest pain or shortness of breath with exertion.    Sleep  Continued issues with falling asleep.  Uses Ambien intermittently and this helps.    Medications, past medical history, allergies, and social history have been reviewed and updated.      Objective:       Exam:  /72 (BP Location: Right arm, Patient Position: Sitting, BP Cuff Size: Adult)   Pulse 84   Temp 36.1 °C (97 °F)   Resp 14   Ht 1.753 m (5' 9\")   Wt 98.4 kg (217 lb)   SpO2 94%   BMI 32.05 kg/m²  Body mass index is 32.05 kg/m².    Constitutional: Alert. Well appearing. No distress.  Skin: Warm, dry, good turgor.  The right upper forehead there are a few scattered erythematous papules and patches.  No overlying crust.  No vesicles.  Eye: Equal, round and reactive to light, conjunctiva clear, lids normal.  ENMT: Moist mucous membranes.   Respiratory: Normal effort. Lungs are clear to auscultation bilaterally.  Cardiovascular: Regular rate and rhythm. Normal S1/S2. No murmurs, rubs or gallops.   Neuro: Moves all four extremities. No facial droop.  Psych: Answers questions appropriately. Normal affect and mood.    Assessment & Plan:     41 y.o. male with the following -     1. Dermatitis  Erythematous papules and patches to the right upper forehead.  Does report occasional pustule but it appears more likely eczema versus less likely folliculitis.  We will try Kenalog, if that does not " work would try mupirocin.  - triamcinolone acetonide (KENALOG) 0.1 % Cream; Apply 1 Application topically 2 times a day.  Dispense: 28.4 g; Refill: 0    2. Gastroesophageal reflux disease, unspecified whether esophagitis present  Recent urgent care visit for reflux symptoms.  Have improved since then.  Discussed dietary modifications.  Can try H2 blocker as needed and if symptoms continue can start daily PPI.    3. Primary insomnia  Discussed sleep hygiene, he will continue with intermittent Ambien use.  - zolpidem (AMBIEN) 10 MG Tab; Take 1 Tablet by mouth at bedtime as needed for up to 30 days.  Dispense: 30 Tablet; Refill: 0    4. Crohn's disease of both small and large intestine without complication (HCC)  Stable on Humira, following with GI.  - CBC WITH DIFFERENTIAL; Future  - Comp Metabolic Panel; Future    5. Screening for cardiovascular condition  - Lipid Profile; Future    6. Need for vaccination  GI recommending Shingrix vaccination due to Crohn's on Humira as well as history of shingles.  Needs Rx for this.  - Zoster Vac Recomb Adjuvanted (SHINGRIX) 50 MCG/0.5ML Recon Susp; Inject 0.5 mL into the shoulder, thigh, or buttocks one time for 1 dose.  Dispense: 1 Each; Refill: 0  - Tdap Vaccine =>8YO IM  - Pneumococcal Conjugate Vaccine 20-Valent (19 yrs+)      Please note that this note was created using voice recognition software.

## 2022-06-17 ENCOUNTER — HOSPITAL ENCOUNTER (OUTPATIENT)
Dept: LAB | Facility: MEDICAL CENTER | Age: 42
End: 2022-06-17
Attending: FAMILY MEDICINE
Payer: COMMERCIAL

## 2022-06-17 DIAGNOSIS — Z13.6 SCREENING FOR CARDIOVASCULAR CONDITION: ICD-10-CM

## 2022-06-17 DIAGNOSIS — K50.80 CROHN'S DISEASE OF BOTH SMALL AND LARGE INTESTINE WITHOUT COMPLICATION (HCC): ICD-10-CM

## 2022-06-17 LAB
ALBUMIN SERPL BCP-MCNC: 4.6 G/DL (ref 3.2–4.9)
ALBUMIN/GLOB SERPL: 1.4 G/DL
ALP SERPL-CCNC: 55 U/L (ref 30–99)
ALT SERPL-CCNC: 26 U/L (ref 2–50)
ANION GAP SERPL CALC-SCNC: 12 MMOL/L (ref 7–16)
AST SERPL-CCNC: 26 U/L (ref 12–45)
BASOPHILS # BLD AUTO: 0.7 % (ref 0–1.8)
BASOPHILS # BLD: 0.03 K/UL (ref 0–0.12)
BILIRUB SERPL-MCNC: 0.7 MG/DL (ref 0.1–1.5)
BUN SERPL-MCNC: 14 MG/DL (ref 8–22)
CALCIUM SERPL-MCNC: 9.8 MG/DL (ref 8.5–10.5)
CHLORIDE SERPL-SCNC: 104 MMOL/L (ref 96–112)
CHOLEST SERPL-MCNC: 180 MG/DL (ref 100–199)
CO2 SERPL-SCNC: 25 MMOL/L (ref 20–33)
CREAT SERPL-MCNC: 0.9 MG/DL (ref 0.5–1.4)
EOSINOPHIL # BLD AUTO: 0.25 K/UL (ref 0–0.51)
EOSINOPHIL NFR BLD: 5.8 % (ref 0–6.9)
ERYTHROCYTE [DISTWIDTH] IN BLOOD BY AUTOMATED COUNT: 46.2 FL (ref 35.9–50)
FASTING STATUS PATIENT QL REPORTED: NORMAL
GFR SERPLBLD CREATININE-BSD FMLA CKD-EPI: 109 ML/MIN/1.73 M 2
GLOBULIN SER CALC-MCNC: 3.2 G/DL (ref 1.9–3.5)
GLUCOSE SERPL-MCNC: 80 MG/DL (ref 65–99)
HCT VFR BLD AUTO: 45.3 % (ref 42–52)
HDLC SERPL-MCNC: 59 MG/DL
HGB BLD-MCNC: 15.4 G/DL (ref 14–18)
IMM GRANULOCYTES # BLD AUTO: 0.01 K/UL (ref 0–0.11)
IMM GRANULOCYTES NFR BLD AUTO: 0.2 % (ref 0–0.9)
LDLC SERPL CALC-MCNC: 97 MG/DL
LYMPHOCYTES # BLD AUTO: 1.77 K/UL (ref 1–4.8)
LYMPHOCYTES NFR BLD: 40.9 % (ref 22–41)
MCH RBC QN AUTO: 33.3 PG (ref 27–33)
MCHC RBC AUTO-ENTMCNC: 34 G/DL (ref 33.7–35.3)
MCV RBC AUTO: 97.8 FL (ref 81.4–97.8)
MONOCYTES # BLD AUTO: 0.44 K/UL (ref 0–0.85)
MONOCYTES NFR BLD AUTO: 10.2 % (ref 0–13.4)
NEUTROPHILS # BLD AUTO: 1.83 K/UL (ref 1.82–7.42)
NEUTROPHILS NFR BLD: 42.2 % (ref 44–72)
NRBC # BLD AUTO: 0 K/UL
NRBC BLD-RTO: 0 /100 WBC
PLATELET # BLD AUTO: 279 K/UL (ref 164–446)
PMV BLD AUTO: 9.1 FL (ref 9–12.9)
POTASSIUM SERPL-SCNC: 4.2 MMOL/L (ref 3.6–5.5)
PROT SERPL-MCNC: 7.8 G/DL (ref 6–8.2)
RBC # BLD AUTO: 4.63 M/UL (ref 4.7–6.1)
SODIUM SERPL-SCNC: 141 MMOL/L (ref 135–145)
TRIGL SERPL-MCNC: 121 MG/DL (ref 0–149)
WBC # BLD AUTO: 4.3 K/UL (ref 4.8–10.8)

## 2022-06-17 PROCEDURE — 80061 LIPID PANEL: CPT

## 2022-06-17 PROCEDURE — 36415 COLL VENOUS BLD VENIPUNCTURE: CPT

## 2022-06-17 PROCEDURE — 80053 COMPREHEN METABOLIC PANEL: CPT

## 2022-06-17 PROCEDURE — 85025 COMPLETE CBC W/AUTO DIFF WBC: CPT

## 2022-08-29 DIAGNOSIS — L30.9 DERMATITIS: ICD-10-CM

## 2022-08-29 RX ORDER — TRIAMCINOLONE ACETONIDE 1 MG/G
CREAM TOPICAL
Qty: 30 G | Refills: 1 | Status: SHIPPED | OUTPATIENT
Start: 2022-08-29

## 2022-11-12 ENCOUNTER — HOSPITAL ENCOUNTER (OUTPATIENT)
Dept: RADIOLOGY | Facility: MEDICAL CENTER | Age: 42
End: 2022-11-12
Attending: PHYSICIAN ASSISTANT
Payer: COMMERCIAL

## 2022-11-12 DIAGNOSIS — M25.562 ACUTE PAIN OF LEFT KNEE: ICD-10-CM

## 2022-11-17 PROCEDURE — 73721 MRI JNT OF LWR EXTRE W/O DYE: CPT | Mod: LT | Performed by: PHYSICIAN ASSISTANT

## 2023-02-09 ENCOUNTER — PATIENT MESSAGE (OUTPATIENT)
Dept: MEDICAL GROUP | Facility: LAB | Age: 43
End: 2023-02-09
Payer: COMMERCIAL

## 2023-02-09 DIAGNOSIS — F51.01 PRIMARY INSOMNIA: ICD-10-CM

## 2023-02-10 RX ORDER — ZOLPIDEM TARTRATE 10 MG/1
10 TABLET ORAL
Qty: 30 TABLET | Refills: 0 | Status: SHIPPED | OUTPATIENT
Start: 2023-02-10 | End: 2023-04-18 | Stop reason: SDUPTHER

## 2023-03-29 ENCOUNTER — OFFICE VISIT (OUTPATIENT)
Dept: MEDICAL GROUP | Facility: LAB | Age: 43
End: 2023-03-29
Payer: COMMERCIAL

## 2023-03-29 VITALS
HEART RATE: 78 BPM | TEMPERATURE: 97 F | HEIGHT: 69 IN | BODY MASS INDEX: 33.15 KG/M2 | DIASTOLIC BLOOD PRESSURE: 82 MMHG | RESPIRATION RATE: 12 BRPM | OXYGEN SATURATION: 95 % | WEIGHT: 223.8 LBS | SYSTOLIC BLOOD PRESSURE: 116 MMHG

## 2023-03-29 DIAGNOSIS — Z00.00 WELL ADULT EXAM: ICD-10-CM

## 2023-03-29 DIAGNOSIS — K21.9 GERD WITHOUT ESOPHAGITIS: ICD-10-CM

## 2023-03-29 DIAGNOSIS — F51.01 PRIMARY INSOMNIA: ICD-10-CM

## 2023-03-29 PROCEDURE — 99214 OFFICE O/P EST MOD 30 MIN: CPT | Performed by: FAMILY MEDICINE

## 2023-03-29 RX ORDER — OMEPRAZOLE 20 MG/1
20 CAPSULE, DELAYED RELEASE ORAL DAILY
Qty: 30 CAPSULE | Refills: 1 | Status: SHIPPED | OUTPATIENT
Start: 2023-03-29 | End: 2023-04-18 | Stop reason: SDUPTHER

## 2023-03-29 ASSESSMENT — FIBROSIS 4 INDEX: FIB4 SCORE: 0.79

## 2023-03-29 ASSESSMENT — PATIENT HEALTH QUESTIONNAIRE - PHQ9: CLINICAL INTERPRETATION OF PHQ2 SCORE: 0

## 2023-03-29 NOTE — PROGRESS NOTES
"Subjective:     CC: GERD    HPI:   Andres presents today to discuss GERD/heartburn.  Return of dull burning pain to bilateral chest.  He does have some concern regarding cardiac risk as he recently heard of a friend dying from a heart attack.  Pain is not exertional, no shortness of breath or diaphoresis.  No family history of early CAD.  Pain does happen at night when laying flat, does seem to be triggered by alcohol at times.    No nausea or vomiting.  No trouble swallowing.  Chronic diarrhea with Crohn's disease.  No melena or blood in stool.  Last colonoscopy in 2021 with repeat due this summer.      Medications, past medical history, allergies, and social history have been reviewed and updated.      Objective:       Exam:  /82 (BP Location: Right arm, Patient Position: Sitting, BP Cuff Size: Large adult)   Pulse 78   Temp 36.1 °C (97 °F)   Resp 12   Ht 1.753 m (5' 9\")   Wt 102 kg (223 lb 12.8 oz)   SpO2 95%   BMI 33.05 kg/m²  Body mass index is 33.05 kg/m².    Constitutional: Alert. Well appearing. No distress.  Skin: Warm, dry, good turgor, no visible rashes.  Eye: Equal, round and reactive to light, conjunctiva clear, lids normal.  ENMT: Moist mucous membranes.   Respiratory: Normal effort. Lungs are clear to auscultation bilaterally.  Cardiovascular: Regular rate and rhythm. Normal S1/S2. No murmurs, rubs or gallops.   Abdomen: Soft and nontender.  Non-distended.  Neuro: Moves all four extremities. No facial droop.  Psych: Answers questions appropriately. Normal affect and mood.    Assessment & Plan:     42 y.o. male with the following -     1. GERD without esophagitis  Return of central, dull, burning chest pain worsened with laying flat or alcohol use.  Not consistent with cardiopulmonary source of pain.  Recommend he restart Prilosec, follow dietary recommendations for GERD.  Follow-up if no improvement in 1 to 2 months.  Discussed reasons to seek care.  He is due for repeat colonoscopy later " this year for Crohn's and did recommend that he consider discussing possible EGD with gastroenterology if symptoms continue  - omeprazole (PRILOSEC) 20 MG delayed-release capsule; Take 1 Capsule by mouth every day.  Dispense: 30 Capsule; Refill: 1    2. Well adult exam  - CBC WITH DIFFERENTIAL; Future  - Comp Metabolic Panel; Future  - Lipid Profile; Future    3. Primary insomnia  Continue intermittent Ambien as needed.  Can provide refill in the future. PDMP reviewed.       Please note that this note was created using voice recognition software.

## 2023-04-18 DIAGNOSIS — F51.01 PRIMARY INSOMNIA: ICD-10-CM

## 2023-04-18 DIAGNOSIS — K21.9 GERD WITHOUT ESOPHAGITIS: ICD-10-CM

## 2023-04-18 RX ORDER — OMEPRAZOLE 20 MG/1
20 CAPSULE, DELAYED RELEASE ORAL DAILY
Qty: 90 CAPSULE | Refills: 3 | Status: SHIPPED | OUTPATIENT
Start: 2023-04-18

## 2023-04-18 RX ORDER — ZOLPIDEM TARTRATE 10 MG/1
10 TABLET ORAL
Qty: 90 TABLET | Refills: 0 | Status: SHIPPED | OUTPATIENT
Start: 2023-04-18 | End: 2023-09-26

## 2023-04-18 NOTE — TELEPHONE ENCOUNTER
Received request via: Pharmacy    Was the patient seen in the last year in this department? Yes  LOV 03/29/2023  Does the patient have an active prescription (recently filled or refills available) for medication(s) requested? No    Does the patient have [facility] Plus and need 100 day supply (blood pressure, diabetes and cholesterol meds only)? Medication is not for cholesterol, blood pressure or diabetes and Patient does not have SCP

## 2023-04-18 NOTE — TELEPHONE ENCOUNTER
Received request via: Pharmacy    Was the patient seen in the last year in this department? Yes  LOV 03/29/2023  Does the patient have an active prescription (recently filled or refills available) for medication(s) requested? No    Does the patient have retirement Plus and need 100 day supply (blood pressure, diabetes and cholesterol meds only)? Patient does not have SCP

## 2023-05-26 ENCOUNTER — HOSPITAL ENCOUNTER (OUTPATIENT)
Dept: LAB | Facility: MEDICAL CENTER | Age: 43
End: 2023-05-26
Attending: FAMILY MEDICINE
Payer: COMMERCIAL

## 2023-05-26 DIAGNOSIS — Z00.00 WELL ADULT EXAM: ICD-10-CM

## 2023-05-26 LAB
ALBUMIN SERPL BCP-MCNC: 4.4 G/DL (ref 3.2–4.9)
ALBUMIN/GLOB SERPL: 1.3 G/DL
ALP SERPL-CCNC: 54 U/L (ref 30–99)
ALT SERPL-CCNC: 46 U/L (ref 2–50)
ANION GAP SERPL CALC-SCNC: 12 MMOL/L (ref 7–16)
AST SERPL-CCNC: 27 U/L (ref 12–45)
BASOPHILS # BLD AUTO: 0.9 % (ref 0–1.8)
BASOPHILS # BLD: 0.04 K/UL (ref 0–0.12)
BILIRUB SERPL-MCNC: 0.5 MG/DL (ref 0.1–1.5)
BUN SERPL-MCNC: 16 MG/DL (ref 8–22)
CALCIUM ALBUM COR SERPL-MCNC: 9.4 MG/DL (ref 8.5–10.5)
CALCIUM SERPL-MCNC: 9.7 MG/DL (ref 8.5–10.5)
CHLORIDE SERPL-SCNC: 109 MMOL/L (ref 96–112)
CHOLEST SERPL-MCNC: 203 MG/DL (ref 100–199)
CO2 SERPL-SCNC: 24 MMOL/L (ref 20–33)
CREAT SERPL-MCNC: 0.82 MG/DL (ref 0.5–1.4)
EOSINOPHIL # BLD AUTO: 0.29 K/UL (ref 0–0.51)
EOSINOPHIL NFR BLD: 6.4 % (ref 0–6.9)
ERYTHROCYTE [DISTWIDTH] IN BLOOD BY AUTOMATED COUNT: 44 FL (ref 35.9–50)
GFR SERPLBLD CREATININE-BSD FMLA CKD-EPI: 111 ML/MIN/1.73 M 2
GLOBULIN SER CALC-MCNC: 3.5 G/DL (ref 1.9–3.5)
GLUCOSE SERPL-MCNC: 102 MG/DL (ref 65–99)
HCT VFR BLD AUTO: 46.7 % (ref 42–52)
HDLC SERPL-MCNC: 51 MG/DL
HGB BLD-MCNC: 15.7 G/DL (ref 14–18)
IMM GRANULOCYTES # BLD AUTO: 0 K/UL (ref 0–0.11)
IMM GRANULOCYTES NFR BLD AUTO: 0 % (ref 0–0.9)
LDLC SERPL CALC-MCNC: 130 MG/DL
LYMPHOCYTES # BLD AUTO: 1.76 K/UL (ref 1–4.8)
LYMPHOCYTES NFR BLD: 39.1 % (ref 22–41)
MCH RBC QN AUTO: 32.1 PG (ref 27–33)
MCHC RBC AUTO-ENTMCNC: 33.6 G/DL (ref 32.3–36.5)
MCV RBC AUTO: 95.5 FL (ref 81.4–97.8)
MONOCYTES # BLD AUTO: 0.49 K/UL (ref 0–0.85)
MONOCYTES NFR BLD AUTO: 10.9 % (ref 0–13.4)
NEUTROPHILS # BLD AUTO: 1.92 K/UL (ref 1.82–7.42)
NEUTROPHILS NFR BLD: 42.7 % (ref 44–72)
NRBC # BLD AUTO: 0 K/UL
NRBC BLD-RTO: 0 /100 WBC (ref 0–0.2)
PLATELET # BLD AUTO: 289 K/UL (ref 164–446)
PMV BLD AUTO: 9.1 FL (ref 9–12.9)
POTASSIUM SERPL-SCNC: 4.6 MMOL/L (ref 3.6–5.5)
PROT SERPL-MCNC: 7.9 G/DL (ref 6–8.2)
RBC # BLD AUTO: 4.89 M/UL (ref 4.7–6.1)
SODIUM SERPL-SCNC: 145 MMOL/L (ref 135–145)
TRIGL SERPL-MCNC: 108 MG/DL (ref 0–149)
WBC # BLD AUTO: 4.5 K/UL (ref 4.8–10.8)

## 2023-05-26 PROCEDURE — 36415 COLL VENOUS BLD VENIPUNCTURE: CPT

## 2023-05-26 PROCEDURE — 80053 COMPREHEN METABOLIC PANEL: CPT

## 2023-05-26 PROCEDURE — 80061 LIPID PANEL: CPT

## 2023-05-26 PROCEDURE — 85025 COMPLETE CBC W/AUTO DIFF WBC: CPT

## 2023-07-17 NOTE — TELEPHONE ENCOUNTER
Received request via: Pharmacy    Was the patient seen in the last year in this department? Yes  4/21/22  Does the patient have an active prescription (recently filled or refills available) for medication(s) requested? No    
Quality 130: Documentation Of Current Medications In The Medical Record: Current Medications Documented
Detail Level: Detailed
Quality 111:Pneumonia Vaccination Status For Older Adults: Patient received any pneumococcal conjugate or polysaccharide vaccine on or after their 60th birthday and before the end of the measurement period

## 2023-09-01 ENCOUNTER — APPOINTMENT (OUTPATIENT)
Dept: MEDICAL GROUP | Facility: LAB | Age: 43
End: 2023-09-01
Payer: COMMERCIAL

## 2023-09-24 DIAGNOSIS — F51.01 PRIMARY INSOMNIA: ICD-10-CM

## 2023-09-24 DIAGNOSIS — K21.9 GERD WITHOUT ESOPHAGITIS: ICD-10-CM

## 2023-09-24 RX ORDER — OMEPRAZOLE 20 MG/1
20 CAPSULE, DELAYED RELEASE ORAL DAILY
Qty: 90 CAPSULE | Refills: 3 | Status: CANCELLED | OUTPATIENT
Start: 2023-09-24

## 2023-09-25 ENCOUNTER — TELEPHONE (OUTPATIENT)
Dept: MEDICAL GROUP | Facility: LAB | Age: 43
End: 2023-09-25
Payer: COMMERCIAL

## 2023-09-26 RX ORDER — ZOLPIDEM TARTRATE 10 MG/1
TABLET ORAL
Qty: 30 TABLET | Refills: 0 | Status: SHIPPED | OUTPATIENT
Start: 2023-09-26 | End: 2023-09-27

## 2023-09-27 ENCOUNTER — OFFICE VISIT (OUTPATIENT)
Dept: MEDICAL GROUP | Facility: LAB | Age: 43
End: 2023-09-27
Payer: COMMERCIAL

## 2023-09-27 VITALS
TEMPERATURE: 97.8 F | BODY MASS INDEX: 30.72 KG/M2 | WEIGHT: 207.4 LBS | RESPIRATION RATE: 14 BRPM | HEIGHT: 69 IN | HEART RATE: 80 BPM | OXYGEN SATURATION: 93 % | SYSTOLIC BLOOD PRESSURE: 116 MMHG | DIASTOLIC BLOOD PRESSURE: 68 MMHG

## 2023-09-27 DIAGNOSIS — F51.01 PRIMARY INSOMNIA: ICD-10-CM

## 2023-09-27 PROCEDURE — 3078F DIAST BP <80 MM HG: CPT | Performed by: NURSE PRACTITIONER

## 2023-09-27 PROCEDURE — 3074F SYST BP LT 130 MM HG: CPT | Performed by: NURSE PRACTITIONER

## 2023-09-27 PROCEDURE — 99213 OFFICE O/P EST LOW 20 MIN: CPT | Performed by: NURSE PRACTITIONER

## 2023-09-27 RX ORDER — ZOLPIDEM TARTRATE 10 MG/1
10 TABLET ORAL NIGHTLY PRN
Qty: 90 TABLET | Refills: 0 | Status: SHIPPED | OUTPATIENT
Start: 2023-09-27 | End: 2023-12-26

## 2023-09-27 ASSESSMENT — FIBROSIS 4 INDEX: FIB4 SCORE: 0.59

## 2023-09-27 NOTE — PROGRESS NOTES
"Subjective:     CC:   Chief Complaint   Patient presents with    Medication Refill     Ambien      HPI:   Andres presents today with the following:    Primary insomnia  This is chronic. Patient has difficulty with sleeping. Primarily secondary to mind racing. Difficulty falling asleep and staying asleep. Currently taking Ambien 10 mg nightly.  She denies any falls or amnestic events.    ROS:   Gen: no fevers/chills, no changes in weight  Eyes: no changes in vision  ENT: no sore throat, no hearing loss, no bloody nose  Pulm: no sob, no cough  CV: no chest pain, no palpitations  GI: no nausea/vomiting, no diarrhea  : no dysuria  MSk: no myalgias  Skin: no rash  Neuro: no headaches, no numbness/tingling  Heme/Lymph: no easy bruising        - NOTE: All other systems reviewed and are negative, except as in HPI.    Objective:     Exam: /68 (BP Location: Right arm, Patient Position: Sitting, BP Cuff Size: Adult)   Pulse 80   Temp 36.6 °C (97.8 °F)   Resp 14   Ht 1.753 m (5' 9\")   Wt 94.1 kg (207 lb 6.4 oz)   SpO2 93%  Body mass index is 30.63 kg/m².    Constitutional: Alert, no distress, well-groomed.  Skin: Warm, dry, good turgor, no rashes in visible areas.  Eye: Equal, round and reactive, conjunctiva clear, lids normal.  ENMT: Lips without lesions, good dentition, moist mucous membranes.  Neck: Trachea midline, no masses, no thyromegaly.  Respiratory: Unlabored respiratory effort, no cough.  MSK: Normal gait, moves all extremities.  Neuro: Grossly non-focal.   Psych: Alert and oriented x3, normal affect and mood.    Assessment & Plan:     42 y.o. male with the following -     1. Primary insomnia  Chronic issue for patient.  Patient to take medication as prescribed. Emphasized importance of maintaining good sleep hygiene including: no caffeine after 3pm, no napping, using bedroom only for sleep or sex, no TV or phones before bed.   Obtained and reviewed patient utilization report from [facility] " database on 9/27/2023 prior to writing prescription for controlled substance II, III or IV per [place] bill . Based on assessment of the report, the prescription is medically necessary.   - zolpidem (AMBIEN) 10 MG Tab; Take 1 Tablet by mouth at bedtime as needed for Sleep for up to 90 days.  Dispense: 90 Tablet; Refill: 0

## 2023-09-27 NOTE — ASSESSMENT & PLAN NOTE
This is chronic. Patient has difficulty with sleeping. Primarily secondary to mind racing. Difficulty falling asleep and staying asleep. Currently taking Ambien 10 mg nightly.  She denies any falls or amnestic events.

## 2023-11-07 ENCOUNTER — OFFICE VISIT (OUTPATIENT)
Dept: URGENT CARE | Facility: CLINIC | Age: 43
End: 2023-11-07
Payer: COMMERCIAL

## 2023-11-07 VITALS
OXYGEN SATURATION: 95 % | WEIGHT: 205 LBS | HEIGHT: 69 IN | TEMPERATURE: 97 F | SYSTOLIC BLOOD PRESSURE: 110 MMHG | DIASTOLIC BLOOD PRESSURE: 68 MMHG | RESPIRATION RATE: 18 BRPM | BODY MASS INDEX: 30.36 KG/M2 | HEART RATE: 88 BPM

## 2023-11-07 DIAGNOSIS — J03.90 EXUDATIVE TONSILLITIS: ICD-10-CM

## 2023-11-07 DIAGNOSIS — J02.0 STREP PHARYNGITIS: ICD-10-CM

## 2023-11-07 DIAGNOSIS — J02.9 SORE THROAT: ICD-10-CM

## 2023-11-07 LAB — S PYO DNA SPEC NAA+PROBE: DETECTED

## 2023-11-07 PROCEDURE — 3074F SYST BP LT 130 MM HG: CPT | Performed by: PHYSICIAN ASSISTANT

## 2023-11-07 PROCEDURE — 99214 OFFICE O/P EST MOD 30 MIN: CPT | Performed by: PHYSICIAN ASSISTANT

## 2023-11-07 PROCEDURE — 3078F DIAST BP <80 MM HG: CPT | Performed by: PHYSICIAN ASSISTANT

## 2023-11-07 PROCEDURE — 87651 STREP A DNA AMP PROBE: CPT | Performed by: PHYSICIAN ASSISTANT

## 2023-11-07 RX ORDER — LIDOCAINE HYDROCHLORIDE 20 MG/ML
SOLUTION OROPHARYNGEAL
Qty: 100 ML | Refills: 0 | Status: SHIPPED | OUTPATIENT
Start: 2023-11-07

## 2023-11-07 RX ORDER — DEXAMETHASONE SODIUM PHOSPHATE 10 MG/ML
10 INJECTION INTRAMUSCULAR; INTRAVENOUS ONCE
Status: COMPLETED | OUTPATIENT
Start: 2023-11-07 | End: 2023-11-07

## 2023-11-07 RX ORDER — CEPHALEXIN 500 MG/1
500 CAPSULE ORAL 2 TIMES DAILY
Qty: 20 CAPSULE | Refills: 0 | Status: SHIPPED | OUTPATIENT
Start: 2023-11-07 | End: 2023-11-17

## 2023-11-07 RX ORDER — ACETAMINOPHEN 325 MG/1
650 TABLET ORAL EVERY 4 HOURS PRN
COMMUNITY

## 2023-11-07 RX ADMIN — DEXAMETHASONE SODIUM PHOSPHATE 10 MG: 10 INJECTION INTRAMUSCULAR; INTRAVENOUS at 08:39

## 2023-11-07 ASSESSMENT — ENCOUNTER SYMPTOMS
FEVER: 0
DIARRHEA: 1
CHILLS: 0
SORE THROAT: 1

## 2023-11-07 ASSESSMENT — FIBROSIS 4 INDEX: FIB4 SCORE: 0.61

## 2023-11-07 NOTE — LETTER
November 7, 2023    To Whom It May Concern:         This is confirmation that Andres Thacker attended his scheduled appointment with Franco Olivas P.A.-C. on 11/07/23. He may go back to work on 11/13/23.          If you have any questions please do not hesitate to call me at the phone number listed below.    Sincerely,      Franco Olivas P.A.-C.               [phone]

## 2023-11-07 NOTE — PROGRESS NOTES
Subjective:   Andres Thacker is a 43 y.o. male who presents today with   Chief Complaint   Patient presents with    Other     Swollen tonsils, pus  x 3 days, started with a dry throat, worse today, tired,  diarrhea, neck tenderness:  going up to his ears      Other  This is a new problem. Episode onset: 3 days. The problem occurs constantly. The problem has been unchanged. Associated symptoms include a sore throat. Pertinent negatives include no chills or fever. Associated symptoms comments: diarrhea. He has tried NSAIDs (Chloraseptic spray) for the symptoms. The treatment provided mild relief.     PMH:  has a past medical history of Allergy and Crohn's disease (HCC).  MEDS:   Current Outpatient Medications:     acetaminophen (TYLENOL) 325 MG Tab, Take 650 mg by mouth every four hours as needed., Disp: , Rfl:     lidocaine (XYLOCAINE) 2 % Solution, Gargle and spit 5 mL every 6 hours as needed for sore throat., Disp: 100 mL, Rfl: 0    cephALEXin (KEFLEX) 500 MG Cap, Take 1 Capsule by mouth 2 times a day for 10 days., Disp: 20 Capsule, Rfl: 0    zolpidem (AMBIEN) 10 MG Tab, Take 1 Tablet by mouth at bedtime as needed for Sleep for up to 90 days., Disp: 90 Tablet, Rfl: 0    omeprazole (PRILOSEC) 20 MG delayed-release capsule, Take 1 Capsule by mouth every day., Disp: 90 Capsule, Rfl: 3    HUMIRA PEN 40 MG/0.8ML Pen-injector Kit, , Disp: , Rfl:     triamcinolone acetonide (KENALOG) 0.1 % Cream, APPLY TO AFFECTED AREA TWICE A DAY, Disp: 30 g, Rfl: 1    azathioprine (IMURAN) 50 MG Tab, Take 1 Tab by mouth every day., Disp: 30 Tab, Rfl:     Adalimumab 40 MG/0.8ML Prefilled Syringe Kit, Inject  as instructed., Disp: , Rfl:   ALLERGIES:   Allergies   Allergen Reactions    Pcn [Penicillins] Hives    Remicade [Infliximab] Anaphylaxis     SURGHX:   Past Surgical History:   Procedure Laterality Date    VASECTOMY       SOCHX:  reports that he has never smoked. He has never used smokeless tobacco. He reports current  "alcohol use. He reports that he does not use drugs.  FH: Reviewed with patient, not pertinent to this visit.     Review of Systems   Constitutional:  Negative for chills and fever.   HENT:  Positive for sore throat.    Gastrointestinal:  Positive for diarrhea.      Objective:   /68 (BP Location: Left arm, Patient Position: Sitting, BP Cuff Size: Adult long)   Pulse 88   Temp 36.1 °C (97 °F) (Temporal)   Resp 18   Ht 1.753 m (5' 9\")   Wt 93 kg (205 lb)   SpO2 95%   BMI 30.27 kg/m²   Physical Exam  Vitals and nursing note reviewed.   Constitutional:       General: He is not in acute distress.     Appearance: Normal appearance. He is well-developed. He is not ill-appearing or toxic-appearing.   HENT:      Head: Normocephalic and atraumatic.      Right Ear: Hearing, tympanic membrane and ear canal normal.      Left Ear: Hearing, tympanic membrane and ear canal normal.      Mouth/Throat:      Mouth: Mucous membranes are moist.      Pharynx: Uvula midline. Posterior oropharyngeal erythema present. No uvula swelling.      Tonsils: Tonsillar exudate present. No tonsillar abscesses. 2+ on the right. 2+ on the left.   Cardiovascular:      Rate and Rhythm: Normal rate and regular rhythm.      Heart sounds: Normal heart sounds.   Pulmonary:      Effort: Pulmonary effort is normal. No respiratory distress.      Breath sounds: Normal breath sounds. No stridor. No wheezing, rhonchi or rales.   Musculoskeletal:      Comments: Normal movement in all 4 extremities   Lymphadenopathy:      Head:      Right side of head: Tonsillar adenopathy present.      Left side of head: Tonsillar adenopathy present.   Skin:     General: Skin is warm and dry.   Neurological:      Mental Status: He is alert.      Coordination: Coordination normal.   Psychiatric:         Mood and Affect: Mood normal.       STREP A +    Assessment/Plan:   Assessment    1. Exudative tonsillitis  - dexamethasone (Decadron) injection (check route below) 10 " mg  - lidocaine (XYLOCAINE) 2 % Solution; Gargle and spit 5 mL every 6 hours as needed for sore throat.  Dispense: 100 mL; Refill: 0    2. Sore throat  - POCT GROUP A STREP, PCR    3. Strep pharyngitis  - cephALEXin (KEFLEX) 500 MG Cap; Take 1 Capsule by mouth 2 times a day for 10 days.  Dispense: 20 Capsule; Refill: 0    Other orders  - acetaminophen (TYLENOL) 325 MG Tab; Take 650 mg by mouth every four hours as needed.    Symptoms and presentation are consistent with strep and confirmed with rapid testing at this time.  We will treat accordingly with antibiotics.  Recommend patient switch out toothbrush after being on antibiotics for a couple days.    Differential diagnosis, natural history, supportive care, and indications for immediate follow-up discussed.   Patient given instructions and understanding of medications and treatment.    If not improving in 3-5 days, F/U with PCP or return to  if symptoms worsen.    Patient agreeable to plan.      Please note that this dictation was created using voice recognition software. I have made every reasonable attempt to correct obvious errors, but I expect that there are errors of grammar and possibly content that I did not discover before finalizing the note.    Franco Olivas PA-C

## 2023-12-24 DIAGNOSIS — F51.01 PRIMARY INSOMNIA: ICD-10-CM

## 2023-12-26 RX ORDER — ZOLPIDEM TARTRATE 10 MG/1
10 TABLET ORAL
Qty: 90 TABLET | Refills: 0 | Status: SHIPPED | OUTPATIENT
Start: 2023-12-26 | End: 2024-03-25

## 2024-02-05 RX ORDER — CEPHALEXIN 500 MG/1
CAPSULE ORAL
Qty: 20 CAPSULE | Refills: 0 | OUTPATIENT
Start: 2024-02-05

## 2024-03-29 DIAGNOSIS — F51.01 PRIMARY INSOMNIA: ICD-10-CM

## 2024-03-29 RX ORDER — ZOLPIDEM TARTRATE 10 MG/1
10 TABLET ORAL
Qty: 30 TABLET | Refills: 0 | Status: SHIPPED | OUTPATIENT
Start: 2024-03-29 | End: 2024-04-28

## 2024-04-14 DIAGNOSIS — K21.9 GERD WITHOUT ESOPHAGITIS: ICD-10-CM

## 2024-04-15 NOTE — TELEPHONE ENCOUNTER
Received request via: Pharmacy    Was the patient seen in the last year in this department? Yes    Does the patient have an active prescription (recently filled or refills available) for medication(s) requested? No    Pharmacy Name: [facility]    Does the patient have nursing home Plus and need 100 day supply (blood pressure, diabetes and cholesterol meds only)? Patient does not have SCP

## 2024-04-16 RX ORDER — OMEPRAZOLE 20 MG/1
20 CAPSULE, DELAYED RELEASE ORAL DAILY
Qty: 90 CAPSULE | Refills: 0 | Status: SHIPPED | OUTPATIENT
Start: 2024-04-16

## 2024-04-28 DIAGNOSIS — F51.01 PRIMARY INSOMNIA: ICD-10-CM

## 2024-04-30 RX ORDER — ZOLPIDEM TARTRATE 10 MG/1
10 TABLET ORAL
Qty: 30 TABLET | Refills: 0 | Status: SHIPPED | OUTPATIENT
Start: 2024-04-30 | End: 2024-05-30

## 2024-05-30 ENCOUNTER — OFFICE VISIT (OUTPATIENT)
Dept: MEDICAL GROUP | Facility: LAB | Age: 44
End: 2024-05-30
Payer: COMMERCIAL

## 2024-05-30 VITALS
DIASTOLIC BLOOD PRESSURE: 70 MMHG | RESPIRATION RATE: 19 BRPM | SYSTOLIC BLOOD PRESSURE: 126 MMHG | TEMPERATURE: 97.3 F | WEIGHT: 214 LBS | HEIGHT: 70 IN | HEART RATE: 65 BPM | OXYGEN SATURATION: 98 % | BODY MASS INDEX: 30.64 KG/M2

## 2024-05-30 DIAGNOSIS — M25.511 CHRONIC RIGHT SHOULDER PAIN: ICD-10-CM

## 2024-05-30 DIAGNOSIS — G89.29 CHRONIC RIGHT SHOULDER PAIN: ICD-10-CM

## 2024-05-30 DIAGNOSIS — Z00.00 WELL ADULT EXAM: ICD-10-CM

## 2024-05-30 DIAGNOSIS — F51.01 PRIMARY INSOMNIA: ICD-10-CM

## 2024-05-30 DIAGNOSIS — Z11.59 SCREENING FOR VIRAL DISEASE: ICD-10-CM

## 2024-05-30 RX ORDER — MELOXICAM 15 MG/1
15 TABLET ORAL DAILY
Qty: 14 TABLET | Refills: 0 | Status: SHIPPED | OUTPATIENT
Start: 2024-05-30 | End: 2024-06-13

## 2024-05-30 RX ORDER — ZOLPIDEM TARTRATE 10 MG/1
10 TABLET ORAL
Qty: 90 TABLET | Refills: 0 | Status: SHIPPED | OUTPATIENT
Start: 2024-05-30 | End: 2024-08-28

## 2024-05-30 ASSESSMENT — FIBROSIS 4 INDEX: FIB4 SCORE: 0.61

## 2024-05-30 NOTE — PROGRESS NOTES
"Subjective:     CC:   Chief Complaint   Patient presents with    Annual Exam     Patient is here today for an annual exam. Patient states that he is concerned about his right shoulder.        HPI:   Andres Thacker is a 43 y.o. male who presents for an annual exam.     Last colonoscopy: 2021 - due for repeat with Crohn's disease  Last Tdap: 2022   Qualify for abdominal us screen: No  Qualify for hep c screen:ordered  Regular exercise: yes - basketball league  Diet: \"okay\" could improve. Limits sweets    Concerned about right shoulder pain today.  See separate visit note.      He  has a past medical history of Allergy and Crohn's disease (HCC).  He  has a past surgical history that includes vasectomy.  Family History   Problem Relation Age of Onset    Other Mother         eczema    No Known Problems Father      Social History     Tobacco Use    Smoking status: Never    Smokeless tobacco: Never   Vaping Use    Vaping status: Never Used   Substance Use Topics    Alcohol use: Yes     Comment: 1x daily    Drug use: No       Patient Active Problem List    Diagnosis Date Noted    Vertigo 01/21/2020    Postherpetic neuralgia 05/12/2017    Primary insomnia 03/14/2017    Obesity (BMI 30-39.9) 11/29/2016    Low vitamin D level 11/29/2016    Crohn's disease of both small and large intestine without complication (HCC) 11/29/2016       Current Outpatient Medications   Medication Sig Dispense Refill    zolpidem (AMBIEN) 10 MG Tab Take 1 Tablet by mouth at bedtime as needed for Sleep for up to 30 days. 30 Tablet 0    omeprazole (PRILOSEC) 20 MG delayed-release capsule TAKE 1 CAPSULE DAILY 90 Capsule 0    HUMIRA PEN 40 MG/0.8ML Pen-injector Kit       azathioprine (IMURAN) 50 MG Tab Take 1 Tab by mouth every day. 30 Tab     acetaminophen (TYLENOL) 325 MG Tab Take 650 mg by mouth every four hours as needed. (Patient not taking: Reported on 5/30/2024)      lidocaine (XYLOCAINE) 2 % Solution Gargle and spit 5 mL every 6 hours as " "needed for sore throat. (Patient not taking: Reported on 5/30/2024) 100 mL 0    triamcinolone acetonide (KENALOG) 0.1 % Cream APPLY TO AFFECTED AREA TWICE A DAY (Patient not taking: Reported on 5/30/2024) 30 g 1    Adalimumab 40 MG/0.8ML Prefilled Syringe Kit Inject  as instructed. (Patient not taking: Reported on 5/30/2024)       No current facility-administered medications for this visit.    (including changes today)  Allergies: Pcn [penicillins] and Remicade [infliximab]    Review of Systems   Constitutional: Negative for fever, chills and malaise/fatigue.   HENT: Negative for congestion.    Eyes: Negative for pain.   Respiratory: Negative for cough and shortness of breath.    Cardiovascular: Negative for leg swelling.   Gastrointestinal: Negative for nausea, vomiting, abdominal pain and diarrhea.   Genitourinary: Negative for dysuria and hematuria.   Skin: Negative for rash.   Neurological: Negative for dizziness, focal weakness and headaches.   Endo/Heme/Allergies: Does not bruise/bleed easily.   Psychiatric/Behavioral: Negative for depression.  The patient is not nervous/anxious.      Objective:     /70   Pulse 65   Temp 36.3 °C (97.3 °F) (Temporal)   Resp 19   Ht 1.765 m (5' 9.5\")   Wt 97.1 kg (214 lb)   SpO2 98%   BMI 31.15 kg/m²   Body mass index is 31.15 kg/m².  Wt Readings from Last 4 Encounters:   05/30/24 97.1 kg (214 lb)   11/07/23 93 kg (205 lb)   09/27/23 94.1 kg (207 lb 6.4 oz)   03/29/23 102 kg (223 lb 12.8 oz)       Physical Exam:  Constitutional: Well-developed and well-nourished. Not diaphoretic. No distress.   Skin: Skin is warm and dry. No rash noted.  Head: Atraumatic without lesions.  Eyes: Conjunctivae and extraocular motions are normal. Pupils are equal, round, and reactive to light. No scleral icterus.   Ears:  External ears unremarkable.   Nose: Nares patent. Septum midline. T  Mouth/Throat: Dentition is normal. Tongue normal. Oropharynx is clear and moist. Posterior pharynx " without erythema or exudates.  Neck: Supple, trachea midline. Normal range of motion. No thyromegaly present. No lymphadenopathy--cervical or supraclavicular.  Cardiovascular: Regular rate and rhythm, S1 and S2 without murmur, rubs, or gallops.    Chest: Effort normal. Clear to auscultation throughout.  Extremities: No cyanosis, clubbing, erythema, nor edema. Distal pulses intact and symmetric.   Musculoskeletal: Normal passive and active range of motion at the right shoulder.  Normal strength the rotator cuff testing but empty can, internal/external rotation, and liftoff test all produce pain.  Negative Neer's test.  Neurological: Moves all 4 extremities. No facial droop  Psychiatric:  Behavior, mood, and affect are appropriate.    Assessment and Plan:     1. Well adult exam  Health maintenance reviewed and updated.  Age-appropriate anticipatory guidance provided.  - CBC WITH DIFFERENTIAL; Future  - Comp Metabolic Panel; Future  - Lipid Profile; Future    2. Screening for viral disease  - HEP C VIRUS ANTIBODY; Future  - HIV AG/AB COMBO ASSAY SCREENING; Future      Follow-up: Return in about 1 year (around 5/30/2025), or if symptoms worsen or fail to improve.    Please note that this note was created using voice recognition software.

## 2024-05-30 NOTE — PROGRESS NOTES
"Subjective:     CC: Shoulder pain, ambien refill    HPI:   Andres presents today with right shoulder pain and to discuss Ambien refill.  Anterior/lateral right shoulder pain going on for 2 to 3 months.  No discrete initial injury but is noticing with variety of movements including bench or incline press at the gym as well as overhead activities.  Range of motion not significantly limited.  No swelling or bruising.  Has noticed some clicking but feels like this is more over the clavicle and medial to the pain.    Continues on Ambien for chronic insomnia.  This works well and he does not have significant side effects including parasomnias.  PDMP reviewed.    Medications, past medical history, allergies, and social history have been reviewed and updated.      Objective:       Exam:  /70   Pulse 65   Temp 36.3 °C (97.3 °F) (Temporal)   Resp 19   Ht 1.765 m (5' 9.5\")   Wt 97.1 kg (214 lb)   SpO2 98%   BMI 31.15 kg/m²  Body mass index is 31.15 kg/m².    Constitutional: Well-developed and well-nourished. Not diaphoretic. No distress.   Skin: Skin is warm and dry. No rash noted.  Head: Atraumatic without lesions.  Eyes: Conjunctivae and extraocular motions are normal. Pupils are equal, round, and reactive to light. No scleral icterus.   Ears:  External ears unremarkable.   Nose: Nares patent. Septum midline. T  Mouth/Throat: Dentition is normal. Tongue normal. Oropharynx is clear and moist. Posterior pharynx without erythema or exudates.  Neck: Supple, trachea midline. Normal range of motion. No thyromegaly present. No lymphadenopathy--cervical or supraclavicular.  Cardiovascular: Regular rate and rhythm, S1 and S2 without murmur, rubs, or gallops.    Chest: Effort normal. Clear to auscultation throughout.  Extremities: No cyanosis, clubbing, erythema, nor edema. Distal pulses intact and symmetric.   Musculoskeletal: Normal passive and active range of motion at the right shoulder.  Normal strength the rotator " cuff testing but empty can, internal/external rotation, and liftoff test all produce pain.  Negative Neer's test.  Neurological: Moves all 4 extremities. No facial droop  Psychiatric:  Behavior, mood, and affect are appropriate.    Assessment & Plan:     43 y.o. male with the following -     1. Chronic right shoulder pain  Anterior/lateral right shoulder pain for 2 to 3 months that is triggered by activities including overhead movements.  Exam indicative of likely rotator cuff tendinitis but labral tear or chronic rotator cuff tear also possible.  X-ray ordered for further evaluation and rule out bony pathology.  Start PT and course of Mobic.  Follow-up if not improving.  - meloxicam (MOBIC) 15 MG tablet; Take 1 Tablet by mouth every day for 14 days.  Dispense: 14 Tablet; Refill: 0  - DX-SHOULDER 2+ RIGHT; Future  - Referral to Physical Therapy    2. Primary insomnia  Chronic and stable.  Adequately treated with Ambien without significant side effects.  PDMP reviewed.  Continue Ambien.  - zolpidem (AMBIEN) 10 MG Tab; Take 1 Tablet by mouth at bedtime as needed for Sleep for up to 90 days.  Dispense: 90 Tablet; Refill: 0       Please note that this note was created using voice recognition software.

## 2024-06-03 ENCOUNTER — APPOINTMENT (OUTPATIENT)
Dept: RADIOLOGY | Facility: MEDICAL CENTER | Age: 44
End: 2024-06-03
Attending: FAMILY MEDICINE
Payer: COMMERCIAL

## 2024-06-03 DIAGNOSIS — M25.511 CHRONIC RIGHT SHOULDER PAIN: ICD-10-CM

## 2024-06-03 DIAGNOSIS — G89.29 CHRONIC RIGHT SHOULDER PAIN: ICD-10-CM

## 2024-06-03 PROCEDURE — 73030 X-RAY EXAM OF SHOULDER: CPT | Mod: RT

## 2024-07-10 ENCOUNTER — HOSPITAL ENCOUNTER (OUTPATIENT)
Dept: LAB | Facility: MEDICAL CENTER | Age: 44
End: 2024-07-10
Attending: FAMILY MEDICINE
Payer: COMMERCIAL

## 2024-07-10 DIAGNOSIS — Z00.00 WELL ADULT EXAM: ICD-10-CM

## 2024-07-10 DIAGNOSIS — Z11.59 SCREENING FOR VIRAL DISEASE: ICD-10-CM

## 2024-07-10 LAB
ALBUMIN SERPL BCP-MCNC: 4.3 G/DL (ref 3.2–4.9)
ALBUMIN/GLOB SERPL: 1.4 G/DL
ALP SERPL-CCNC: 71 U/L (ref 30–99)
ALT SERPL-CCNC: 20 U/L (ref 2–50)
ANION GAP SERPL CALC-SCNC: 11 MMOL/L (ref 7–16)
AST SERPL-CCNC: 22 U/L (ref 12–45)
BASOPHILS # BLD AUTO: 0.6 % (ref 0–1.8)
BASOPHILS # BLD: 0.03 K/UL (ref 0–0.12)
BILIRUB SERPL-MCNC: 0.6 MG/DL (ref 0.1–1.5)
BUN SERPL-MCNC: 23 MG/DL (ref 8–22)
CALCIUM ALBUM COR SERPL-MCNC: 9.1 MG/DL (ref 8.5–10.5)
CALCIUM SERPL-MCNC: 9.3 MG/DL (ref 8.5–10.5)
CHLORIDE SERPL-SCNC: 105 MMOL/L (ref 96–112)
CHOLEST SERPL-MCNC: 198 MG/DL (ref 100–199)
CO2 SERPL-SCNC: 24 MMOL/L (ref 20–33)
CREAT SERPL-MCNC: 0.99 MG/DL (ref 0.5–1.4)
EOSINOPHIL # BLD AUTO: 0.37 K/UL (ref 0–0.51)
EOSINOPHIL NFR BLD: 7.8 % (ref 0–6.9)
ERYTHROCYTE [DISTWIDTH] IN BLOOD BY AUTOMATED COUNT: 43.7 FL (ref 35.9–50)
GFR SERPLBLD CREATININE-BSD FMLA CKD-EPI: 96 ML/MIN/1.73 M 2
GLOBULIN SER CALC-MCNC: 3.1 G/DL (ref 1.9–3.5)
GLUCOSE SERPL-MCNC: 108 MG/DL (ref 65–99)
HCT VFR BLD AUTO: 44.8 % (ref 42–52)
HDLC SERPL-MCNC: 52 MG/DL
HGB BLD-MCNC: 15.4 G/DL (ref 14–18)
IMM GRANULOCYTES # BLD AUTO: 0.01 K/UL (ref 0–0.11)
IMM GRANULOCYTES NFR BLD AUTO: 0.2 % (ref 0–0.9)
LDLC SERPL CALC-MCNC: 127 MG/DL
LYMPHOCYTES # BLD AUTO: 1.88 K/UL (ref 1–4.8)
LYMPHOCYTES NFR BLD: 39.4 % (ref 22–41)
MCH RBC QN AUTO: 32.6 PG (ref 27–33)
MCHC RBC AUTO-ENTMCNC: 34.4 G/DL (ref 32.3–36.5)
MCV RBC AUTO: 94.7 FL (ref 81.4–97.8)
MONOCYTES # BLD AUTO: 0.49 K/UL (ref 0–0.85)
MONOCYTES NFR BLD AUTO: 10.3 % (ref 0–13.4)
NEUTROPHILS # BLD AUTO: 1.99 K/UL (ref 1.82–7.42)
NEUTROPHILS NFR BLD: 41.7 % (ref 44–72)
NRBC # BLD AUTO: 0 K/UL
NRBC BLD-RTO: 0 /100 WBC (ref 0–0.2)
PLATELET # BLD AUTO: 267 K/UL (ref 164–446)
PMV BLD AUTO: 9.2 FL (ref 9–12.9)
POTASSIUM SERPL-SCNC: 4.2 MMOL/L (ref 3.6–5.5)
PROT SERPL-MCNC: 7.4 G/DL (ref 6–8.2)
RBC # BLD AUTO: 4.73 M/UL (ref 4.7–6.1)
SODIUM SERPL-SCNC: 140 MMOL/L (ref 135–145)
TRIGL SERPL-MCNC: 95 MG/DL (ref 0–149)
WBC # BLD AUTO: 4.8 K/UL (ref 4.8–10.8)

## 2024-07-10 PROCEDURE — 80053 COMPREHEN METABOLIC PANEL: CPT

## 2024-07-10 PROCEDURE — 36415 COLL VENOUS BLD VENIPUNCTURE: CPT

## 2024-07-10 PROCEDURE — 87389 HIV-1 AG W/HIV-1&-2 AB AG IA: CPT

## 2024-07-10 PROCEDURE — 80061 LIPID PANEL: CPT

## 2024-07-10 PROCEDURE — 85025 COMPLETE CBC W/AUTO DIFF WBC: CPT

## 2024-07-10 PROCEDURE — 86803 HEPATITIS C AB TEST: CPT

## 2024-07-11 LAB
HCV AB SER QL: NORMAL
HIV 1+2 AB+HIV1 P24 AG SERPL QL IA: NORMAL

## 2024-07-14 DIAGNOSIS — K21.9 GERD WITHOUT ESOPHAGITIS: ICD-10-CM

## 2024-07-15 RX ORDER — OMEPRAZOLE 20 MG/1
CAPSULE, DELAYED RELEASE ORAL
Qty: 90 CAPSULE | Refills: 3 | Status: SHIPPED | OUTPATIENT
Start: 2024-07-15

## 2024-09-03 ENCOUNTER — HOSPITAL ENCOUNTER (OUTPATIENT)
Dept: RADIOLOGY | Facility: MEDICAL CENTER | Age: 44
End: 2024-09-03
Attending: FAMILY MEDICINE
Payer: COMMERCIAL

## 2024-09-03 ENCOUNTER — APPOINTMENT (OUTPATIENT)
Dept: MEDICAL GROUP | Facility: LAB | Age: 44
End: 2024-09-03
Payer: COMMERCIAL

## 2024-09-03 VITALS
HEART RATE: 64 BPM | HEIGHT: 69 IN | SYSTOLIC BLOOD PRESSURE: 124 MMHG | OXYGEN SATURATION: 96 % | BODY MASS INDEX: 32.44 KG/M2 | DIASTOLIC BLOOD PRESSURE: 80 MMHG | TEMPERATURE: 97.3 F | RESPIRATION RATE: 16 BRPM | WEIGHT: 219 LBS

## 2024-09-03 DIAGNOSIS — F51.01 PRIMARY INSOMNIA: ICD-10-CM

## 2024-09-03 DIAGNOSIS — S69.91XA INJURY OF FINGER OF RIGHT HAND, INITIAL ENCOUNTER: ICD-10-CM

## 2024-09-03 DIAGNOSIS — B07.8 OTHER VIRAL WARTS: ICD-10-CM

## 2024-09-03 PROCEDURE — 99213 OFFICE O/P EST LOW 20 MIN: CPT | Mod: 25 | Performed by: FAMILY MEDICINE

## 2024-09-03 PROCEDURE — 3079F DIAST BP 80-89 MM HG: CPT | Performed by: FAMILY MEDICINE

## 2024-09-03 PROCEDURE — 17110 DESTRUCTION B9 LES UP TO 14: CPT | Performed by: FAMILY MEDICINE

## 2024-09-03 PROCEDURE — 73140 X-RAY EXAM OF FINGER(S): CPT | Mod: RT

## 2024-09-03 PROCEDURE — 3074F SYST BP LT 130 MM HG: CPT | Performed by: FAMILY MEDICINE

## 2024-09-03 RX ORDER — ZOLPIDEM TARTRATE 10 MG/1
10 TABLET ORAL NIGHTLY PRN
Qty: 90 TABLET | Refills: 0 | Status: SHIPPED | OUTPATIENT
Start: 2024-09-03 | End: 2024-12-02

## 2024-09-03 ASSESSMENT — PATIENT HEALTH QUESTIONNAIRE - PHQ9: CLINICAL INTERPRETATION OF PHQ2 SCORE: 0

## 2024-09-03 ASSESSMENT — FIBROSIS 4 INDEX: FIB4 SCORE: 0.81

## 2024-09-03 NOTE — PROCEDURES
Dov Egan - Benign    Date/Time: 9/3/2024 3:16 PM    Performed by: Tyron Padgtet M.D.  Authorized by: Tyron Padgett M.D.    Number of Lesions: 1  Lesion 1:     Body area: upper extremity    Upper extremity location: L hand    Malignancy: benign lesion      Destruction method: cryotherapy      Cryo cycles: 3

## 2024-09-03 NOTE — PROGRESS NOTES
"Subjective:     CC: Med refill, finger injury    HPI:   Andres presents today discuss medication refill and finger injury.  Continues on Ambien nightly for sleep.  Insomnia is adequately treated with this and he has not noted significant side effects.  PDMP reviewed.    Jammed right fifth finger playing basketball about 1 month ago.  Did not seek care as initially thought it was likely just a sprain but he has had persistent deformity over the PIP joint and limited extension.    Has a wart to the dorsum of the left hand he would like treated.    Medications, past medical history, allergies, and social history have been reviewed and updated.      Objective:       Exam:  /80   Pulse 64   Temp 36.3 °C (97.3 °F)   Resp 16   Ht 1.753 m (5' 9\")   Wt 99.3 kg (219 lb)   SpO2 96%   BMI 32.34 kg/m²  Body mass index is 32.34 kg/m².    Constitutional: Alert. Well appearing. No distress.  Skin: Warm, dry, good turgor, no visible rashes.  Verrucous papule present to the dorsum of the left hand.  ENMT: Moist mucous membranes. Normal dentition.  Respiratory: Normal effort.   MSK: Deformity present to the right fifth finger with swelling over the PIP and radial deviation of the distal finger.  Flexion is intact to the fifth finger but limited ability to extend.  Neuro: Moves all four extremities. No facial droop.  Psych: Answers questions appropriately. Normal affect and mood.    Assessment & Plan:     43 y.o. male with the following -     1. Primary insomnia  Stable.  Well-controlled with Ambien without significant side effects.  PDMP reviewed.  Refill provided.  - zolpidem (AMBIEN) 10 MG Tab; Take 1 Tablet by mouth at bedtime as needed for Sleep for up to 90 days.  Dispense: 90 Tablet; Refill: 0    2. Injury of finger of right hand, initial encounter  Jammed right fifth finger playing basketball about 1 month ago.  Did not seek care as initially thought it was likely just a sprain but he has had persistent deformity " over the PIP joint and limited extension.  Concern for fracture.  X-ray ordered along with referral to hand surgery.  - DX-FINGER(S) 2+ RIGHT; Future  - Referral to Hand Surgery    3. Other viral warts  To the dorsum of the left hand.  Treated with cryotherapy today.  - Dov Egan - Benign      Please note that this note was created using voice recognition software.

## 2025-03-07 ENCOUNTER — APPOINTMENT (OUTPATIENT)
Dept: MEDICAL GROUP | Facility: LAB | Age: 45
End: 2025-03-07
Payer: COMMERCIAL

## 2025-03-07 VITALS
TEMPERATURE: 97.8 F | DIASTOLIC BLOOD PRESSURE: 82 MMHG | HEIGHT: 69 IN | OXYGEN SATURATION: 94 % | RESPIRATION RATE: 12 BRPM | SYSTOLIC BLOOD PRESSURE: 120 MMHG | WEIGHT: 216.8 LBS | BODY MASS INDEX: 32.11 KG/M2 | HEART RATE: 83 BPM

## 2025-03-07 DIAGNOSIS — F41.1 GAD (GENERALIZED ANXIETY DISORDER): ICD-10-CM

## 2025-03-07 DIAGNOSIS — F51.01 PRIMARY INSOMNIA: ICD-10-CM

## 2025-03-07 DIAGNOSIS — K50.80 CROHN'S DISEASE OF BOTH SMALL AND LARGE INTESTINE WITHOUT COMPLICATION (HCC): ICD-10-CM

## 2025-03-07 DIAGNOSIS — Z23 NEED FOR VACCINATION: ICD-10-CM

## 2025-03-07 DIAGNOSIS — F33.1 MODERATE EPISODE OF RECURRENT MAJOR DEPRESSIVE DISORDER (HCC): ICD-10-CM

## 2025-03-07 PROCEDURE — 90656 IIV3 VACC NO PRSV 0.5 ML IM: CPT | Performed by: FAMILY MEDICINE

## 2025-03-07 PROCEDURE — 90471 IMMUNIZATION ADMIN: CPT | Performed by: FAMILY MEDICINE

## 2025-03-07 PROCEDURE — 3079F DIAST BP 80-89 MM HG: CPT | Performed by: FAMILY MEDICINE

## 2025-03-07 PROCEDURE — 99214 OFFICE O/P EST MOD 30 MIN: CPT | Mod: 25 | Performed by: FAMILY MEDICINE

## 2025-03-07 PROCEDURE — 3074F SYST BP LT 130 MM HG: CPT | Performed by: FAMILY MEDICINE

## 2025-03-07 RX ORDER — ESCITALOPRAM OXALATE 10 MG/1
10 TABLET ORAL DAILY
Qty: 90 TABLET | Refills: 3 | Status: SHIPPED | OUTPATIENT
Start: 2025-03-07

## 2025-03-07 RX ORDER — ZOLPIDEM TARTRATE 10 MG/1
10 TABLET ORAL NIGHTLY PRN
Qty: 90 TABLET | Refills: 0 | Status: SHIPPED | OUTPATIENT
Start: 2025-03-07 | End: 2025-06-05

## 2025-03-07 RX ORDER — ZOLPIDEM TARTRATE 10 MG/1
TABLET ORAL
COMMUNITY
End: 2025-03-07 | Stop reason: SDUPTHER

## 2025-03-07 ASSESSMENT — ANXIETY QUESTIONNAIRES
2. NOT BEING ABLE TO STOP OR CONTROL WORRYING: NEARLY EVERY DAY
4. TROUBLE RELAXING: NEARLY EVERY DAY
3. WORRYING TOO MUCH ABOUT DIFFERENT THINGS: NEARLY EVERY DAY
7. FEELING AFRAID AS IF SOMETHING AWFUL MIGHT HAPPEN: SEVERAL DAYS
5. BEING SO RESTLESS THAT IT IS HARD TO SIT STILL: MORE THAN HALF THE DAYS
GAD7 TOTAL SCORE: 17
6. BECOMING EASILY ANNOYED OR IRRITABLE: MORE THAN HALF THE DAYS
1. FEELING NERVOUS, ANXIOUS, OR ON EDGE: NEARLY EVERY DAY

## 2025-03-07 ASSESSMENT — PATIENT HEALTH QUESTIONNAIRE - PHQ9
SUM OF ALL RESPONSES TO PHQ QUESTIONS 1-9: 19
5. POOR APPETITE OR OVEREATING: 1 - SEVERAL DAYS
CLINICAL INTERPRETATION OF PHQ2 SCORE: 5

## 2025-03-07 ASSESSMENT — FIBROSIS 4 INDEX: FIB4 SCORE: 0.83

## 2025-03-07 NOTE — PROGRESS NOTES
Subjective:     CC: Depression/anxiety, Crohn's    HPI:   Quinton a 44-year-old male with a history that includes Crohn's disease who presents with concern for depression and anxiety as well as Crohn's disease.    Very high stress at work along with long hours triggering worsening depression and anxiety symptoms.  He has been seeing a therapist weekly for the last 2 months and this has been somewhat helpful but still with significant symptoms.  No plan to harm himself.  Does think he had been on Lexapro in the remote past and may have been helpful.    Crohn's symptoms flared with persistent diarrhea multiple times daily but he does think stress is the trigger for this.    CINDI-7 Questionnaire    Feeling nervous, anxious, or on edge: Nearly every day  Not being able to sop or control worrying: Nearly every day  Worrying too much about different things: Nearly every day  Trouble relaxing: Nearly every day  Being so restless that it's hard to sit still: More than half the days  Becoming easily annoyed or irritable: More than half the days  Feeling afraid as if something awful might happen: Several days  Total: 17    Interpretation of CINDI 7 Total Score   Score Severity :  0-4 No Anxiety   5-9 Mild Anxiety  10-14 Moderate Anxiety  15-21 Severe Anxiety    Depression Screening    Little interest or pleasure in doing things?  2 - more than half the days  Feeling down, depressed , or hopeless? 3 - nearly every day  Trouble falling or staying asleep, or sleeping too much?  3 - nearly every day  Feeling tired or having little energy?  3 - nearly every day  Poor appetite or overeating?  1 - several days  Feeling bad about yourself - or that you are a failure or have let yourself or your family down? 3 - nearly every day  Trouble concentrating on things, such as reading the newspaper or watching television? 2 - more than half the days  Moving or speaking so slowly that other people could have noticed.  Or the opposite - being so  "fidgety or restless that you have been moving around a lot more than usual?  0 - not at all  Thoughts that you would be better off dead, or of hurting yourself?  2 - more than half the days  Patient Health Questionnaire Score: 19      If depressive symptoms identified deferred to follow up visit unless specifically addressed in assesment and plan.    Interpretation of PHQ-9 Total Score   Score Severity   1-4 No Depression   5-9 Mild Depression   10-14 Moderate Depression   15-19 Moderately Severe Depression   20-27 Severe Depression      Current Outpatient Medications   Medication Sig Dispense Refill    Adalimumab-adbm, 2 Pen, 40 MG/0.8ML Auto-injector Kit       omeprazole (PRILOSEC) 20 MG delayed-release capsule TAKE 1 CAPSULE DAILY (NEED APPOINTMENT FOR FURTHER REFILLS) 90 Capsule 3    azathioprine (IMURAN) 50 MG Tab Take 1 Tab by mouth every day. 30 Tab     Adalimumab 40 MG/0.8ML Prefilled Syringe Kit Inject  under the skin.      zolpidem (AMBIEN) 10 MG Tab 30      HUMIRA PEN 40 MG/0.8ML Pen-injector Kit  (Patient not taking: Reported on 3/7/2025)       No current facility-administered medications for this visit.       Medications, past medical history, allergies, and social history have been reviewed and updated.    Objective:       Exam:  /82 (BP Location: Left arm, Patient Position: Sitting, BP Cuff Size: Adult)   Pulse 83   Temp 36.6 °C (97.8 °F) (Temporal)   Resp 12   Ht 1.753 m (5' 9\")   Wt 98.3 kg (216 lb 12.8 oz)   SpO2 94%   BMI 32.02 kg/m²  Body mass index is 32.02 kg/m².    Constitutional: Alert. Well appearing. No distress.  Skin: Warm, dry, good turgor, no visible rashes.  ENMT: Moist mucous membranes. Respiratory: Normal effort.   Neuro: Moves all four extremities. No facial droop.  Psych: Answers questions appropriately. Normal affect and mood.    Assessment & Plan:     44 y.o. male with the following -     1. CINDI (generalized anxiety disorder)  2. Moderate episode of recurrent major " depressive disorder (HCC)  Significantly worsened recent symptoms secondary to stress at work.  He is looking into taking a medical leave from work and we can complete paperwork if needed.  Continue therapy, start Lexapro.  Follow-up 1 month.  - escitalopram (LEXAPRO) 10 MG Tab; Take 1 Tablet by mouth every day.  Dispense: 90 Tablet; Refill: 3    3. Primary insomnia  Stable, continue Ambien.  PDMP reviewed.  - zolpidem (AMBIEN) 10 MG Tab; Take 1 Tablet by mouth at bedtime as needed for Sleep for up to 90 days.  Dispense: 90 Tablet; Refill: 0    4. Need for vaccination  - INFLUENZA VACCINE TRI INJ (PF)    5. Crohn's disease of both small and large intestine without complication (HCC)  Worsening recent symptoms which may be triggered by stress and we will see if this improves with Lexapro and work changes as above.  He does follow with GI.  Continues on azathioprine and adalimumab.           Please note that this note was created using voice recognition software.

## 2025-03-26 ENCOUNTER — OFFICE VISIT (OUTPATIENT)
Dept: MEDICAL GROUP | Facility: LAB | Age: 45
End: 2025-03-26
Payer: COMMERCIAL

## 2025-03-26 VITALS
WEIGHT: 216.8 LBS | SYSTOLIC BLOOD PRESSURE: 120 MMHG | TEMPERATURE: 98.2 F | DIASTOLIC BLOOD PRESSURE: 80 MMHG | RESPIRATION RATE: 12 BRPM | BODY MASS INDEX: 32.11 KG/M2 | HEART RATE: 71 BPM | HEIGHT: 69 IN | OXYGEN SATURATION: 95 %

## 2025-03-26 DIAGNOSIS — F33.1 MODERATE EPISODE OF RECURRENT MAJOR DEPRESSIVE DISORDER (HCC): ICD-10-CM

## 2025-03-26 DIAGNOSIS — F41.1 GAD (GENERALIZED ANXIETY DISORDER): ICD-10-CM

## 2025-03-26 PROCEDURE — 99214 OFFICE O/P EST MOD 30 MIN: CPT | Performed by: FAMILY MEDICINE

## 2025-03-26 PROCEDURE — 3079F DIAST BP 80-89 MM HG: CPT | Performed by: FAMILY MEDICINE

## 2025-03-26 PROCEDURE — 3074F SYST BP LT 130 MM HG: CPT | Performed by: FAMILY MEDICINE

## 2025-03-26 ASSESSMENT — FIBROSIS 4 INDEX: FIB4 SCORE: 0.83

## 2025-03-26 NOTE — PROGRESS NOTES
"Subjective:     CC: Follow up, FMLA    HPI:   Andres presents today follow-up on depression and anxiety, complete [place] paperwork.  Lexapro started at last visit.  Unsure if helping as he does note that symptoms are improving but is not sure if this is because he has taken leave from work.  Noticing more inattention and focus symptoms and wondering if ADHD possibly contributing as well as his children have been diagnosed.  Continues to have significant anhedonia.  Thoughts of self-harm but no plan to act, contracts for safety.  Notes that being there for his family would prevent this.      Current Outpatient Medications   Medication Sig Dispense Refill    escitalopram (LEXAPRO) 10 MG Tab Take 1 Tablet by mouth every day. 90 Tablet 3    zolpidem (AMBIEN) 10 MG Tab Take 1 Tablet by mouth at bedtime as needed for Sleep for up to 90 days. 90 Tablet 0    Adalimumab-adbm, 2 Pen, 40 MG/0.8ML Auto-injector Kit       omeprazole (PRILOSEC) 20 MG delayed-release capsule TAKE 1 CAPSULE DAILY (NEED APPOINTMENT FOR FURTHER REFILLS) 90 Capsule 3    azathioprine (IMURAN) 50 MG Tab Take 1 Tab by mouth every day. 30 Tab     Adalimumab 40 MG/0.8ML Prefilled Syringe Kit Inject  under the skin.       No current facility-administered medications for this visit.       Medications, past medical history, allergies, and social history have been reviewed and updated.      Objective:       Exam:  /80 (BP Location: Left arm, Patient Position: Sitting, BP Cuff Size: Adult)   Pulse 71   Temp 36.8 °C (98.2 °F) (Temporal)   Resp 12   Ht 1.753 m (5' 9\")   Wt 98.3 kg (216 lb 12.8 oz)   SpO2 95%   BMI 32.02 kg/m²  Body mass index is 32.02 kg/m².    Constitutional: Alert. Well appearing. No distress.  Skin: Warm, dry, good turgor, no visible rashes.  Respiratory: Normal effort.  Psych: Answers questions appropriately. Anxious affect and mood.      Assessment & Plan:     44 y.o. male with the following -     1. CINDI (generalized anxiety " disorder)  2. Moderate episode of recurrent major depressive disorder (HCC)  Mild improvement with Lexapro and will continue this at 10 mg for now.  Can reassess once work stressors are improved and see if dose increase needed.  He is taking leave from work and FMLA paperwork is completed.  Continue therapy.  Referral placed to establish with psychiatry.  Thoughts of self-harm but no plan, contracts for safety.  Follow-up 1 month.  - Referral to Behavioral Health      Please note that this note was created using voice recognition software.

## 2025-03-31 NOTE — Clinical Note
REFERRAL APPROVAL NOTICE         Sent on March 31, 2025                   Quinton Langstonlins  [address]  [place] [address]                   Dear Mr. Thacker,    After a careful review of the medical information and benefit coverage, [facility] has processed your referral. See below for additional details.    If applicable, you must be actively enrolled with your insurance for coverage of the authorized service. If you have any questions regarding your coverage, please contact your insurance directly.    REFERRAL INFORMATION   Referral #:  [id number]  Referred-To Department    Referred-By Provider:  Behavioral Health    Tyron Padgett M.D.   Behavioral Health Outpatient      [address]  [place] [address]  [phone] [address] [facility] [address] [facility] [address]  [place] [id number] [phone]    Referral Start Date:  03/26/2025  Referral End Date:   03/26/2026             SCHEDULING  If you do not already have an appointment, please call [phone] to make an appointment.     MORE INFORMATION  If you do not already have a [employer] account, sign up at: [email or web address]  You can access your medical information, make appointments, see lab results, billing information, and more.  If you have questions regarding this referral, please contact  the [facility] Referrals department at:             [phone]. Monday - Friday 8:00AM - 5:00PM.     Sincerely,    [facility]

## 2025-04-23 ENCOUNTER — OFFICE VISIT (OUTPATIENT)
Dept: MEDICAL GROUP | Facility: LAB | Age: 45
End: 2025-04-23
Payer: COMMERCIAL

## 2025-04-23 VITALS
BODY MASS INDEX: 32.38 KG/M2 | HEART RATE: 73 BPM | HEIGHT: 69 IN | RESPIRATION RATE: 14 BRPM | DIASTOLIC BLOOD PRESSURE: 80 MMHG | SYSTOLIC BLOOD PRESSURE: 116 MMHG | OXYGEN SATURATION: 94 % | WEIGHT: 218.6 LBS | TEMPERATURE: 98.5 F

## 2025-04-23 DIAGNOSIS — F33.1 MODERATE EPISODE OF RECURRENT MAJOR DEPRESSIVE DISORDER (HCC): ICD-10-CM

## 2025-04-23 DIAGNOSIS — F41.1 GAD (GENERALIZED ANXIETY DISORDER): ICD-10-CM

## 2025-04-23 PROCEDURE — 99214 OFFICE O/P EST MOD 30 MIN: CPT | Performed by: FAMILY MEDICINE

## 2025-04-23 PROCEDURE — 3079F DIAST BP 80-89 MM HG: CPT | Performed by: FAMILY MEDICINE

## 2025-04-23 PROCEDURE — 3074F SYST BP LT 130 MM HG: CPT | Performed by: FAMILY MEDICINE

## 2025-04-23 ASSESSMENT — PATIENT HEALTH QUESTIONNAIRE - PHQ9
CLINICAL INTERPRETATION OF PHQ2 SCORE: 3
SUM OF ALL RESPONSES TO PHQ QUESTIONS 1-9: 13
5. POOR APPETITE OR OVEREATING: 1 - SEVERAL DAYS

## 2025-04-23 ASSESSMENT — FIBROSIS 4 INDEX: FIB4 SCORE: 0.83

## 2025-04-23 NOTE — PROGRESS NOTES
Subjective:     CC: Follow up    HPI:   Andres presents today to follow-up on depression and anxiety.  Both improving as he has been on a leave from work.  Does think Lexapro has helped as well.  Overall looking extending leave from work or different employment.  Also planning to establish with psychiatry but has not done this yet, his therapist encouraged him to look into IOP program.  Improved thoughts of self-harm, no plan to act on this.      Depression Screening    Little interest or pleasure in doing things?  1 - several days   Feeling down, depressed , or hopeless? 2 - more than half the days   Trouble falling or staying asleep, or sleeping too much?  3 - nearly every day   Feeling tired or having little energy?  2 - more than half the days   Poor appetite or overeating?  1 - several days   Feeling bad about yourself - or that you are a failure or have let yourself or your family down? 1 - several days   Trouble concentrating on things, such as reading the newspaper or watching television? 2 - more than half the days   Moving or speaking so slowly that other people could have noticed.  Or the opposite - being so fidgety or restless that you have been moving around a lot more than usual?  0 - not at all   Thoughts that you would be better off dead, or of hurting yourself?  1 - several days   Patient Health Questionnaire Score: 13       If depressive symptoms identified deferred to follow up visit unless specifically addressed in assesment and plan.    Interpretation of PHQ-9 Total Score   Score Severity   1-4 No Depression   5-9 Mild Depression   10-14 Moderate Depression   15-19 Moderately Severe Depression   20-27 Severe Depression       Current Outpatient Medications   Medication Sig Dispense Refill    escitalopram (LEXAPRO) 10 MG Tab Take 1 Tablet by mouth every day. 90 Tablet 3    zolpidem (AMBIEN) 10 MG Tab Take 1 Tablet by mouth at bedtime as needed for Sleep for up to 90 days. 90 Tablet 0     "Adalimumab-adbm, 2 Pen, 40 MG/0.8ML Auto-injector Kit       omeprazole (PRILOSEC) 20 MG delayed-release capsule TAKE 1 CAPSULE DAILY (NEED APPOINTMENT FOR FURTHER REFILLS) 90 Capsule 3    azathioprine (IMURAN) 50 MG Tab Take 1 Tab by mouth every day. 30 Tab     Adalimumab 40 MG/0.8ML Prefilled Syringe Kit Inject  under the skin.       No current facility-administered medications for this visit.       Medications, past medical history, allergies, and social history have been reviewed and updated.      Objective:       Exam:  /80 (BP Location: Left arm, Patient Position: Sitting, BP Cuff Size: Adult)   Pulse 73   Temp 36.9 °C (98.5 °F) (Temporal)   Resp 14   Ht 1.753 m (5' 9\")   Wt 99.2 kg (218 lb 9.6 oz)   SpO2 94%   BMI 32.28 kg/m²  Body mass index is 32.28 kg/m².    Constitutional: Alert. Well appearing. No distress.  Skin: Warm, dry, good turgor, no visible rashes.  Respiratory: Normal effort.   Neuro: Moves all four extremities. No facial droop.  Psych: Answers questions appropriately. Normal affect and mood.      Assessment & Plan:     44 y.o. male with the following -     1. CINDI (generalized anxiety disorder)  2. Moderate episode of recurrent major depressive disorder (HCC)  Overall improving with Lexapro and less stress while away from work.  Continue with Lexapro and behavioral measures.  He is following with therapist and he also plans to establish with psychiatry, he is considering IOP program as well.      Please note that this note was created using voice recognition software.      "

## 2025-05-13 ENCOUNTER — HOSPITAL ENCOUNTER (OUTPATIENT)
Dept: LAB | Facility: MEDICAL CENTER | Age: 45
End: 2025-05-13
Attending: INTERNAL MEDICINE
Payer: COMMERCIAL

## 2025-05-13 LAB
25(OH)D3 SERPL-MCNC: 41 NG/ML (ref 30–100)
ALBUMIN SERPL BCP-MCNC: 4.2 G/DL (ref 3.2–4.9)
ALBUMIN/GLOB SERPL: 1.3 G/DL
ALP SERPL-CCNC: 67 U/L (ref 30–99)
ALT SERPL-CCNC: 67 U/L (ref 2–50)
ANION GAP SERPL CALC-SCNC: 11 MMOL/L (ref 7–16)
AST SERPL-CCNC: 45 U/L (ref 12–45)
BILIRUB SERPL-MCNC: 0.8 MG/DL (ref 0.1–1.5)
BUN SERPL-MCNC: 20 MG/DL (ref 8–22)
CALCIUM ALBUM COR SERPL-MCNC: 9.2 MG/DL (ref 8.5–10.5)
CALCIUM SERPL-MCNC: 9.4 MG/DL (ref 8.5–10.5)
CHLORIDE SERPL-SCNC: 101 MMOL/L (ref 96–112)
CO2 SERPL-SCNC: 24 MMOL/L (ref 20–33)
CREAT SERPL-MCNC: 0.92 MG/DL (ref 0.5–1.4)
CRP SERPL HS-MCNC: <0.3 MG/DL (ref 0–0.75)
GFR SERPLBLD CREATININE-BSD FMLA CKD-EPI: 104 ML/MIN/1.73 M 2
GLOBULIN SER CALC-MCNC: 3.2 G/DL (ref 1.9–3.5)
GLUCOSE SERPL-MCNC: 94 MG/DL (ref 65–99)
POTASSIUM SERPL-SCNC: 3.9 MMOL/L (ref 3.6–5.5)
PROT SERPL-MCNC: 7.4 G/DL (ref 6–8.2)
SODIUM SERPL-SCNC: 136 MMOL/L (ref 135–145)
T4 FREE SERPL-MCNC: 1.39 NG/DL (ref 0.93–1.7)
TSH SERPL-ACNC: 0.8 UIU/ML (ref 0.38–5.33)

## 2025-05-13 PROCEDURE — 86480 TB TEST CELL IMMUN MEASURE: CPT

## 2025-05-13 PROCEDURE — 86140 C-REACTIVE PROTEIN: CPT

## 2025-05-13 PROCEDURE — 82306 VITAMIN D 25 HYDROXY: CPT

## 2025-05-13 PROCEDURE — 84439 ASSAY OF FREE THYROXINE: CPT

## 2025-05-13 PROCEDURE — 86258 DGP ANTIBODY EACH IG CLASS: CPT

## 2025-05-13 PROCEDURE — 82784 ASSAY IGA/IGD/IGG/IGM EACH: CPT

## 2025-05-13 PROCEDURE — 84443 ASSAY THYROID STIM HORMONE: CPT

## 2025-05-13 PROCEDURE — 86364 TISS TRNSGLTMNASE EA IG CLAS: CPT

## 2025-05-13 PROCEDURE — 80053 COMPREHEN METABOLIC PANEL: CPT

## 2025-05-13 PROCEDURE — 82397 CHEMILUMINESCENT ASSAY: CPT

## 2025-05-13 PROCEDURE — 36415 COLL VENOUS BLD VENIPUNCTURE: CPT

## 2025-05-13 PROCEDURE — 80145 DRUG ASSAY ADALIMUMAB: CPT

## 2025-05-15 LAB
ADALIMUMAB AB SERPL IA-MCNC: <20 NG/ML
ADALIMUMAB SERPL IA-MCNC: 5.7 UG/ML
GAMMA INTERFERON BACKGROUND BLD IA-ACNC: 0.02 IU/ML
GLIADIN IGA SER IA-ACNC: <0.72 FLU (ref 0–4.99)
M TB IFN-G BLD-IMP: NEGATIVE
M TB IFN-G CD4+ BCKGRND COR BLD-ACNC: 0 IU/ML
MITOGEN IGNF BCKGRD COR BLD-ACNC: 8.03 IU/ML
QFT TB2 - NIL TBQ2: 0 IU/ML
TTG IGA SER IA-ACNC: <1.02 FLU (ref 0–4.99)

## 2025-05-16 ENCOUNTER — HOSPITAL ENCOUNTER (OUTPATIENT)
Facility: MEDICAL CENTER | Age: 45
End: 2025-05-16
Attending: INTERNAL MEDICINE
Payer: COMMERCIAL

## 2025-05-16 ENCOUNTER — HOSPITAL ENCOUNTER (OUTPATIENT)
Dept: LAB | Facility: MEDICAL CENTER | Age: 45
End: 2025-05-16
Attending: INTERNAL MEDICINE
Payer: COMMERCIAL

## 2025-05-16 LAB
BASOPHILS # BLD AUTO: 1.4 % (ref 0–1.8)
BASOPHILS # BLD: 0.07 K/UL (ref 0–0.12)
EOSINOPHIL # BLD AUTO: 0.37 K/UL (ref 0–0.51)
EOSINOPHIL NFR BLD: 7.4 % (ref 0–6.9)
ERYTHROCYTE [DISTWIDTH] IN BLOOD BY AUTOMATED COUNT: 45.9 FL (ref 35.9–50)
HCT VFR BLD AUTO: 45.7 % (ref 42–52)
HGB BLD-MCNC: 15.3 G/DL (ref 14–18)
IGA SERPL-MCNC: 459 MG/DL (ref 68–408)
IMM GRANULOCYTES # BLD AUTO: 0.01 K/UL (ref 0–0.11)
IMM GRANULOCYTES NFR BLD AUTO: 0.2 % (ref 0–0.9)
LYMPHOCYTES # BLD AUTO: 2.06 K/UL (ref 1–4.8)
LYMPHOCYTES NFR BLD: 41.3 % (ref 22–41)
MCH RBC QN AUTO: 32.1 PG (ref 27–33)
MCHC RBC AUTO-ENTMCNC: 33.5 G/DL (ref 32.3–36.5)
MCV RBC AUTO: 96 FL (ref 81.4–97.8)
MONOCYTES # BLD AUTO: 0.55 K/UL (ref 0–0.85)
MONOCYTES NFR BLD AUTO: 11 % (ref 0–13.4)
NEUTROPHILS # BLD AUTO: 1.93 K/UL (ref 1.82–7.42)
NEUTROPHILS NFR BLD: 38.7 % (ref 44–72)
NRBC # BLD AUTO: 0 K/UL
NRBC BLD-RTO: 0 /100 WBC (ref 0–0.2)
PLATELET # BLD AUTO: 280 K/UL (ref 164–446)
PMV BLD AUTO: 9.4 FL (ref 9–12.9)
RBC # BLD AUTO: 4.76 M/UL (ref 4.7–6.1)
WBC # BLD AUTO: 5 K/UL (ref 4.8–10.8)

## 2025-05-16 PROCEDURE — 36415 COLL VENOUS BLD VENIPUNCTURE: CPT

## 2025-05-16 PROCEDURE — 85025 COMPLETE CBC W/AUTO DIFF WBC: CPT

## 2025-05-16 PROCEDURE — 83993 ASSAY FOR CALPROTECTIN FECAL: CPT

## 2025-05-20 LAB — CALPROTECTIN STL-MCNT: 33 UG/G

## 2025-07-07 NOTE — PROGRESS NOTES
This evaluation was conducted via {platform:36119} using secure and encrypted videoconferencing technology. {Virtual or Telemedicine:82205}        INITIAL PSYCHIATRIC EVALUATION      This provider informed the patient their medical records are totally confidential except for the use by other providers involved in their care, or if the patient signs a release, or to report instances of child or elder abuse, or if it is determined they are an immediate risk to harm themselves or others.      CHIEF COMPLAINT  ***      HISTORY OF PRESENT ILLNESS  Andres Thacker is a 44 y.o. old male comes in today to establish care and for evaluation of ***.  I did review all outpatient psychiatry follow up notes over last 2 years to the best of my ability as available.  Patient is new to the clinic.     History of Present Illness              PAST PSYCHIATRIC HISTORY  Prior psychiatric hospitalization: ***  Prior Self harm/suicide attempt: ***  Prior Diagnosis: ***    Therapy: ***    PAST PSYCHIATRIC MEDICATIONS  (Current) Lexapro 10mg QD   (Current) Ambien 10mg QHS PRN for sleep    FAMILY HISTORY  Psychiatric diagnosis:  ***  History of suicide attempts:  ***  Substance abuse history:  ***    SUBSTANCE USE HISTORY:  ALCOHOL: ***  TOBACCO: ***  CANNABIS: ***  OPIOIDS: ***  PRESCRIPTION MEDICATIONS: ***  OTHERS: ***  History of inpatient/outpatient rehab treatment: ***    SOCIAL HISTORY  Childhood: born in *** and describes childhood as ***  Education: ***  in Special Education: ***  Intellectual Disability: ***  Employment: ***  Relationship: ***  Kids: ***  Current living situation: ***  Current/past legal issues: ***  History of emotional/physical/sexual abuse - ***   History: ***  Spiritual/Congregational affiliation: ***    PSYCHIATRIC REVIEW OF SYSTEMS: {Psych ROS:32557}    CURRENT MEDICATIONS:  Current Medications[1]    ALLERGIES:  Pcn [penicillins] and Remicade [infliximab]    MEDICAL REVIEW OF SYSTEMS:   Constitutional  {POSITIVE/NEGATIVE:14}   Eyes {POSITIVE/NEGATIVE:14}   Ears/Nose/Mouth/Throat {POSITIVE/NEGATIVE:14}   Cardiovascular {POSITIVE/NEGATIVE:14}   Respiratory {POSITIVE/NEGATIVE:14}   Gastrointestinal {POSITIVE/NEGATIVE:14}   Genitourinary {POSITIVE/NEGATIVE:14}   Muscular {POSITIVE/NEGATIVE:14}   Integumentary {POSITIVE/NEGATIVE:14}   Neurological {POSITIVE/NEGATIVE:14}   Endocrine {POSITIVE/NEGATIVE:14}   Hematologic/Lymphatic {POSITIVE/NEGATIVE:14}       MEDICAL HISTORY  Past Medical History[2]  Past Surgical History[3]      PHYSICAL EXAMINAION:  Vital signs: There were no vitals taken for this visit.  Musculoskeletal: Normal gait.   Abnormal movements: ***    MENTAL STATUS EXAMINATION      General:   - Grooming and hygiene: {[facility] GROOMIN},   - Apparent distress: ***,   - Behavior: {[facility] BEHAVIOR:94769433}  - Eye Contact:  {[facility] EYE CONTACT:68685580},   - no psychomotor agitation or retardation ***   - Participation: {[facility] PARTICIPATION MEASURES:52685371}  Orientation: {[facility] :61166615} to person, place and time  Mood: {[facility] MOOD:02980125}  Affect: {[facility] AFFECT:72296253},  Thought Process: {[facility] THOUGHT PROCESS:77271236}  Thought Content: Denies suicidal or homicidal ideations, intent or plan {[facility] THOUGHT CONTENT:94729375}  Perception: Denies auditory or visual hallucinations. No delusions noted {[facility] PERCEPTION:18511941}  Attention span and concentration: Intact ***  Speech:{[facility] SPEECH:34187188}  Language: Appropriate ***  Insight: {GOOD/ADEQUATE/LIMITED/POOR:56156448}  Judgment: {GOOD/ADEQUATE/LIMITED/POOR:93348975}  Cognition:  ***   Memory:  ***     Fund of knowledge:  ***       DEPRESSION SCREENIN/3/2024     3:00 PM 3/7/2025     8:00 AM 2025    10:40 AM   Depression Screen (PHQ-2/PHQ-9)   PHQ-2 Total Score 0 5 3   PHQ-9 Total Score  19 13       Interpretation of PHQ-9 Total Score   Score Severity   1-4 No Depression   5-9 Mild Depression   10-14 Moderate Depression   15-19  Moderately Severe Depression   20-27 Severe Depression      SAFETY ASSESSMENT - SELF:    Does patient acknowledge current or past symptoms of dangerousness to self? ***  History of suicide by family member: ***  History of suicide by friend/significant other: ***  Recent change in amount/specificity/intensity of suicidal thoughts or self-harm behavior? ***  Current access to firearms, medications, or other identified means of suicide/self-harm? ***  If yes, willing to restrict access to means of suicide/self-harm? ***  Protective factors present: ***       SAFETY ASSESSMENT - OTHERS:    Does patient acknowledge current or past symptoms of aggressive behavior or risk to others? ***  Recent change in amount/specificity/intensity of thoughts or threats to harm others? ***  Current access to firearms/other identified means of harm? ***  If yes, willing to restrict access to weapons/means of harm? ***       CURRENT RISK:       Suicidal: {RB RATINGS:29379399}       Homicidal: {RB RATINGS:92294943}       Self-Harm: {RB RATINGS:41015763}       Relapse: {[facility] RATINGS:35518318}       Crisis Safety Plan Reviewed {YES/NO/NOT INDICATED:11515}    MEDICAL RECORDS/LABS/DIAGNOSTIC TESTS REVIEWED:  ***      [place]  records -   ***      ASSESSMENT/PLAN:  [unfilled]            No diagnosis found.     Assessment: Quinton Thacker is a 44 y.o. year old male with history of . Biologically, ***    On assessment today,     Medications reviewed with patient and are up to date in the chart. Patient denies significant medication side effects at this time. Patient denies any significant suicidal ideation and does not appear to be at imminent risk of harm to self or others currently. Plan of care was discussed with patient who expressed understanding and agreement and will follow up in *** for reassessment.     Plan:  -     (1)     Medication options, alternatives (including no medications) and medication risks/benefits/side effects were  discussed in detail.  The patient was advised to call, message provider on [employer], or come in to the clinic if symptoms worsen or if any future questions/issues regarding their medications arise; the patient verbalized understanding and agreement.    The patient was educated to call 911, call the suicide hotline, or go to local ER if having thoughts of suicide or homicide; verbalized understanding.        Return to clinic in *** or sooner if symptoms worsen.  Next Appointment:  instruction provided on how to make the next appointment.     The proposed treatment plan was discussed with the patient who was provided the opportunity to ask questions and make suggestions regarding alternative treatment. Patient verbalized understanding and expressed agreement with the plan.     Thank you for allowing me to participate in the care of this patient.    Venkat Roberts M.D.  07/09/25    CC:   Tyron Padgett M.D.          [1]   Current Outpatient Medications   Medication Sig Dispense Refill    zolpidem (AMBIEN) 10 MG Tab Take 1 Tablet by mouth at bedtime as needed for Sleep for up to 90 days. 90 Tablet 0    escitalopram (LEXAPRO) 10 MG Tab Take 1 Tablet by mouth every day. 90 Tablet 3    Adalimumab-adbm, 2 Pen, 40 MG/0.8ML Auto-injector Kit       omeprazole (PRILOSEC) 20 MG delayed-release capsule TAKE 1 CAPSULE DAILY (NEED APPOINTMENT FOR FURTHER REFILLS) 90 Capsule 3    azathioprine (IMURAN) 50 MG Tab Take 1 Tab by mouth every day. 30 Tab     Adalimumab 40 MG/0.8ML Prefilled Syringe Kit Inject  under the skin.       No current facility-administered medications for this visit.   [2]   Past Medical History:  Diagnosis Date    Allergy     Crohn's disease (HCC)    [3]   Past Surgical History:  Procedure Laterality Date    VASECTOMY

## 2025-07-09 ENCOUNTER — APPOINTMENT (OUTPATIENT)
Dept: BEHAVIORAL HEALTH | Facility: CLINIC | Age: 45
End: 2025-07-09
Payer: COMMERCIAL

## 2025-07-15 DIAGNOSIS — F33.1 MODERATE EPISODE OF RECURRENT MAJOR DEPRESSIVE DISORDER (HCC): ICD-10-CM

## 2025-07-15 DIAGNOSIS — F41.1 GAD (GENERALIZED ANXIETY DISORDER): ICD-10-CM

## 2025-07-15 RX ORDER — ESCITALOPRAM OXALATE 10 MG/1
10 TABLET ORAL DAILY
Qty: 90 TABLET | Refills: 3 | Status: SHIPPED | OUTPATIENT
Start: 2025-07-15

## 2025-07-15 NOTE — TELEPHONE ENCOUNTER
Received request via: Pharmacy    Was the patient seen in the last year in this department? Yes  LOV : 4/23/2025  Does the patient have an active prescription (recently filled or refills available) for medication(s) requested? No    [facility] Name: [facility]     Does the patient have snf Plus and need 100-day supply? (This applies to ALL medications) Patient does not have SCP

## 2025-07-29 ENCOUNTER — PATIENT MESSAGE (OUTPATIENT)
Dept: MEDICAL GROUP | Facility: LAB | Age: 45
End: 2025-07-29
Payer: COMMERCIAL

## 2025-07-29 DIAGNOSIS — K21.9 GERD WITHOUT ESOPHAGITIS: ICD-10-CM

## 2025-07-29 RX ORDER — OMEPRAZOLE 20 MG/1
20 CAPSULE, DELAYED RELEASE ORAL DAILY
Qty: 90 CAPSULE | Refills: 3 | Status: SHIPPED | OUTPATIENT
Start: 2025-07-29 | End: 2025-07-29 | Stop reason: SDUPTHER

## 2025-07-29 RX ORDER — OMEPRAZOLE 20 MG/1
20 CAPSULE, DELAYED RELEASE ORAL DAILY
Qty: 90 CAPSULE | Refills: 3 | Status: SHIPPED | OUTPATIENT
Start: 2025-07-29